# Patient Record
Sex: FEMALE | Race: WHITE | NOT HISPANIC OR LATINO
[De-identification: names, ages, dates, MRNs, and addresses within clinical notes are randomized per-mention and may not be internally consistent; named-entity substitution may affect disease eponyms.]

---

## 2017-01-13 ENCOUNTER — RX RENEWAL (OUTPATIENT)
Age: 52
End: 2017-01-13

## 2017-01-23 ENCOUNTER — APPOINTMENT (OUTPATIENT)
Dept: NEUROLOGY | Facility: CLINIC | Age: 52
End: 2017-01-23

## 2017-01-23 VITALS — DIASTOLIC BLOOD PRESSURE: 89 MMHG | HEART RATE: 82 BPM | SYSTOLIC BLOOD PRESSURE: 117 MMHG

## 2017-01-23 VITALS — WEIGHT: 112 LBS | HEIGHT: 67 IN | BODY MASS INDEX: 17.58 KG/M2

## 2017-05-01 ENCOUNTER — OTHER (OUTPATIENT)
Age: 52
End: 2017-05-01

## 2017-07-24 ENCOUNTER — APPOINTMENT (OUTPATIENT)
Dept: NEUROLOGY | Facility: CLINIC | Age: 52
End: 2017-07-24

## 2017-07-24 VITALS
HEIGHT: 67 IN | BODY MASS INDEX: 17.58 KG/M2 | HEART RATE: 57 BPM | SYSTOLIC BLOOD PRESSURE: 109 MMHG | WEIGHT: 112 LBS | DIASTOLIC BLOOD PRESSURE: 69 MMHG

## 2017-08-05 ENCOUNTER — MEDICATION RENEWAL (OUTPATIENT)
Age: 52
End: 2017-08-05

## 2017-10-30 ENCOUNTER — APPOINTMENT (OUTPATIENT)
Dept: NEUROLOGY | Facility: CLINIC | Age: 52
End: 2017-10-30
Payer: COMMERCIAL

## 2017-10-30 VITALS
WEIGHT: 112 LBS | SYSTOLIC BLOOD PRESSURE: 118 MMHG | DIASTOLIC BLOOD PRESSURE: 70 MMHG | BODY MASS INDEX: 17.58 KG/M2 | HEIGHT: 67 IN

## 2017-10-30 PROCEDURE — 99214 OFFICE O/P EST MOD 30 MIN: CPT

## 2017-12-11 ENCOUNTER — OUTPATIENT (OUTPATIENT)
Dept: OUTPATIENT SERVICES | Facility: HOSPITAL | Age: 52
LOS: 1 days | End: 2017-12-11
Payer: COMMERCIAL

## 2017-12-11 ENCOUNTER — APPOINTMENT (OUTPATIENT)
Dept: MAMMOGRAPHY | Facility: CLINIC | Age: 52
End: 2017-12-11
Payer: COMMERCIAL

## 2017-12-11 ENCOUNTER — APPOINTMENT (OUTPATIENT)
Dept: ULTRASOUND IMAGING | Facility: CLINIC | Age: 52
End: 2017-12-11
Payer: COMMERCIAL

## 2017-12-11 DIAGNOSIS — Z00.8 ENCOUNTER FOR OTHER GENERAL EXAMINATION: ICD-10-CM

## 2017-12-11 PROCEDURE — G0202: CPT | Mod: 26

## 2017-12-11 PROCEDURE — 77063 BREAST TOMOSYNTHESIS BI: CPT | Mod: 26

## 2017-12-11 PROCEDURE — 77063 BREAST TOMOSYNTHESIS BI: CPT

## 2017-12-11 PROCEDURE — 77067 SCR MAMMO BI INCL CAD: CPT

## 2018-02-05 ENCOUNTER — RX RENEWAL (OUTPATIENT)
Age: 53
End: 2018-02-05

## 2018-02-05 ENCOUNTER — MEDICATION RENEWAL (OUTPATIENT)
Age: 53
End: 2018-02-05

## 2018-02-06 ENCOUNTER — INPATIENT (INPATIENT)
Facility: HOSPITAL | Age: 53
LOS: 2 days | Discharge: HOME | End: 2018-02-09
Attending: FAMILY MEDICINE | Admitting: FAMILY MEDICINE
Payer: COMMERCIAL

## 2018-02-06 VITALS — HEIGHT: 66 IN | WEIGHT: 110.01 LBS

## 2018-02-06 LAB
ALBUMIN SERPL ELPH-MCNC: 4.1 G/DL — SIGNIFICANT CHANGE UP (ref 3.3–5)
ALP SERPL-CCNC: 72 U/L — SIGNIFICANT CHANGE UP (ref 40–120)
ALT FLD-CCNC: 15 U/L — SIGNIFICANT CHANGE UP (ref 12–78)
ANION GAP SERPL CALC-SCNC: 8 MMOL/L — SIGNIFICANT CHANGE UP (ref 5–17)
APPEARANCE UR: CLEAR — SIGNIFICANT CHANGE UP
AST SERPL-CCNC: 22 U/L — SIGNIFICANT CHANGE UP (ref 15–37)
BACTERIA # UR AUTO: (no result)
BASOPHILS # BLD AUTO: 0 K/UL — SIGNIFICANT CHANGE UP (ref 0–0.2)
BILIRUB SERPL-MCNC: 0.9 MG/DL — SIGNIFICANT CHANGE UP (ref 0.2–1.2)
BILIRUB UR-MCNC: NEGATIVE — SIGNIFICANT CHANGE UP
BUN SERPL-MCNC: 17 MG/DL — SIGNIFICANT CHANGE UP (ref 7–23)
CALCIUM SERPL-MCNC: 9 MG/DL — SIGNIFICANT CHANGE UP (ref 8.5–10.1)
CHLORIDE SERPL-SCNC: 109 MMOL/L — HIGH (ref 96–108)
CO2 SERPL-SCNC: 22 MMOL/L — SIGNIFICANT CHANGE UP (ref 22–31)
COLOR SPEC: YELLOW — SIGNIFICANT CHANGE UP
CREAT SERPL-MCNC: 1.01 MG/DL — SIGNIFICANT CHANGE UP (ref 0.5–1.3)
DIFF PNL FLD: (no result)
EOSINOPHIL # BLD AUTO: 0 K/UL — SIGNIFICANT CHANGE UP (ref 0–0.5)
EPI CELLS # UR: (no result)
GLUCOSE SERPL-MCNC: 95 MG/DL — SIGNIFICANT CHANGE UP (ref 70–99)
GLUCOSE UR QL: NEGATIVE MG/DL — SIGNIFICANT CHANGE UP
HCT VFR BLD CALC: 46.6 % — HIGH (ref 34.5–45)
HGB BLD-MCNC: 15.1 G/DL — SIGNIFICANT CHANGE UP (ref 11.5–15.5)
KETONES UR-MCNC: (no result)
LACTATE SERPL-SCNC: 1.5 MMOL/L — SIGNIFICANT CHANGE UP (ref 0.7–2)
LEUKOCYTE ESTERASE UR-ACNC: NEGATIVE — SIGNIFICANT CHANGE UP
LIDOCAIN IGE QN: 150 U/L — SIGNIFICANT CHANGE UP (ref 73–393)
LYMPHOCYTES # BLD AUTO: 0.2 K/UL — LOW (ref 1–3.3)
LYMPHOCYTES # BLD AUTO: 5 % — LOW (ref 13–44)
MANUAL DIF COMMENT BLD-IMP: SIGNIFICANT CHANGE UP
MCHC RBC-ENTMCNC: 29.7 PG — SIGNIFICANT CHANGE UP (ref 27–34)
MCHC RBC-ENTMCNC: 32.3 GM/DL — SIGNIFICANT CHANGE UP (ref 32–36)
MCV RBC AUTO: 91.9 FL — SIGNIFICANT CHANGE UP (ref 80–100)
METAMYELOCYTES # FLD: 1 % — HIGH (ref 0–0)
MONOCYTES # BLD AUTO: 0.2 K/UL — SIGNIFICANT CHANGE UP (ref 0–0.9)
MONOCYTES NFR BLD AUTO: 2 % — SIGNIFICANT CHANGE UP (ref 2–14)
NEUTROPHILS # BLD AUTO: 7.3 K/UL — SIGNIFICANT CHANGE UP (ref 1.8–7.4)
NEUTROPHILS NFR BLD AUTO: 68 % — SIGNIFICANT CHANGE UP (ref 43–77)
NEUTS BAND # BLD: 24 % — HIGH (ref 0–8)
NITRITE UR-MCNC: NEGATIVE — SIGNIFICANT CHANGE UP
PH UR: 5 — SIGNIFICANT CHANGE UP (ref 5–8)
PLAT MORPH BLD: NORMAL — SIGNIFICANT CHANGE UP
PLATELET # BLD AUTO: 223 K/UL — SIGNIFICANT CHANGE UP (ref 150–400)
POTASSIUM SERPL-MCNC: 3.9 MMOL/L — SIGNIFICANT CHANGE UP (ref 3.5–5.3)
POTASSIUM SERPL-SCNC: 3.9 MMOL/L — SIGNIFICANT CHANGE UP (ref 3.5–5.3)
PROT SERPL-MCNC: 7.9 GM/DL — SIGNIFICANT CHANGE UP (ref 6–8.3)
PROT UR-MCNC: 15 MG/DL
RBC # BLD: 5.07 M/UL — SIGNIFICANT CHANGE UP (ref 3.8–5.2)
RBC # FLD: 12.2 % — SIGNIFICANT CHANGE UP (ref 10.3–14.5)
RBC BLD AUTO: NORMAL — SIGNIFICANT CHANGE UP
RBC CASTS # UR COMP ASSIST: (no result) /HPF (ref 0–4)
SODIUM SERPL-SCNC: 139 MMOL/L — SIGNIFICANT CHANGE UP (ref 135–145)
SP GR SPEC: 1 — LOW (ref 1.01–1.02)
UROBILINOGEN FLD QL: NEGATIVE MG/DL — SIGNIFICANT CHANGE UP
WBC # BLD: 7.7 K/UL — SIGNIFICANT CHANGE UP (ref 3.8–10.5)
WBC # FLD AUTO: 7.7 K/UL — SIGNIFICANT CHANGE UP (ref 3.8–10.5)
WBC UR QL: SIGNIFICANT CHANGE UP

## 2018-02-06 PROCEDURE — 93010 ELECTROCARDIOGRAM REPORT: CPT

## 2018-02-06 PROCEDURE — 71045 X-RAY EXAM CHEST 1 VIEW: CPT | Mod: 26

## 2018-02-06 PROCEDURE — 99285 EMERGENCY DEPT VISIT HI MDM: CPT

## 2018-02-06 PROCEDURE — 74177 CT ABD & PELVIS W/CONTRAST: CPT | Mod: 26

## 2018-02-06 RX ORDER — SODIUM CHLORIDE 9 MG/ML
1000 INJECTION INTRAMUSCULAR; INTRAVENOUS; SUBCUTANEOUS
Qty: 0 | Refills: 0 | Status: DISCONTINUED | OUTPATIENT
Start: 2018-02-06 | End: 2018-02-09

## 2018-02-06 RX ORDER — ONDANSETRON 8 MG/1
4 TABLET, FILM COATED ORAL EVERY 6 HOURS
Qty: 0 | Refills: 0 | Status: DISCONTINUED | OUTPATIENT
Start: 2018-02-06 | End: 2018-02-09

## 2018-02-06 RX ORDER — DOCUSATE SODIUM 100 MG
100 CAPSULE ORAL
Qty: 0 | Refills: 0 | Status: DISCONTINUED | OUTPATIENT
Start: 2018-02-06 | End: 2018-02-09

## 2018-02-06 RX ORDER — LACOSAMIDE 50 MG/1
150 TABLET ORAL
Qty: 0 | Refills: 0 | Status: DISCONTINUED | OUTPATIENT
Start: 2018-02-06 | End: 2018-02-09

## 2018-02-06 RX ORDER — PIPERACILLIN AND TAZOBACTAM 4; .5 G/20ML; G/20ML
3.38 INJECTION, POWDER, LYOPHILIZED, FOR SOLUTION INTRAVENOUS ONCE
Qty: 0 | Refills: 0 | Status: COMPLETED | OUTPATIENT
Start: 2018-02-06 | End: 2018-02-06

## 2018-02-06 RX ORDER — ACETAMINOPHEN 500 MG
650 TABLET ORAL EVERY 6 HOURS
Qty: 0 | Refills: 0 | Status: DISCONTINUED | OUTPATIENT
Start: 2018-02-06 | End: 2018-02-09

## 2018-02-06 RX ORDER — DIPHENHYDRAMINE HCL 50 MG
25 CAPSULE ORAL ONCE
Qty: 0 | Refills: 0 | Status: COMPLETED | OUTPATIENT
Start: 2018-02-06 | End: 2018-02-06

## 2018-02-06 RX ORDER — MORPHINE SULFATE 50 MG/1
2 CAPSULE, EXTENDED RELEASE ORAL EVERY 6 HOURS
Qty: 0 | Refills: 0 | Status: DISCONTINUED | OUTPATIENT
Start: 2018-02-06 | End: 2018-02-09

## 2018-02-06 RX ORDER — LEVOTHYROXINE SODIUM 125 MCG
112 TABLET ORAL DAILY
Qty: 0 | Refills: 0 | Status: DISCONTINUED | OUTPATIENT
Start: 2018-02-06 | End: 2018-02-09

## 2018-02-06 RX ORDER — ONDANSETRON 8 MG/1
4 TABLET, FILM COATED ORAL ONCE
Qty: 0 | Refills: 0 | Status: COMPLETED | OUTPATIENT
Start: 2018-02-06 | End: 2018-02-06

## 2018-02-06 RX ORDER — MORPHINE SULFATE 50 MG/1
4 CAPSULE, EXTENDED RELEASE ORAL ONCE
Qty: 0 | Refills: 0 | Status: DISCONTINUED | OUTPATIENT
Start: 2018-02-06 | End: 2018-02-06

## 2018-02-06 RX ORDER — SODIUM CHLORIDE 9 MG/ML
1000 INJECTION INTRAMUSCULAR; INTRAVENOUS; SUBCUTANEOUS ONCE
Qty: 0 | Refills: 0 | Status: COMPLETED | OUTPATIENT
Start: 2018-02-06 | End: 2018-02-06

## 2018-02-06 RX ORDER — PIPERACILLIN AND TAZOBACTAM 4; .5 G/20ML; G/20ML
3.38 INJECTION, POWDER, LYOPHILIZED, FOR SOLUTION INTRAVENOUS EVERY 8 HOURS
Qty: 0 | Refills: 0 | Status: DISCONTINUED | OUTPATIENT
Start: 2018-02-06 | End: 2018-02-09

## 2018-02-06 RX ORDER — PANTOPRAZOLE SODIUM 20 MG/1
40 TABLET, DELAYED RELEASE ORAL DAILY
Qty: 0 | Refills: 0 | Status: DISCONTINUED | OUTPATIENT
Start: 2018-02-06 | End: 2018-02-07

## 2018-02-06 RX ORDER — VANCOMYCIN HCL 1 G
1000 VIAL (EA) INTRAVENOUS ONCE
Qty: 0 | Refills: 0 | Status: COMPLETED | OUTPATIENT
Start: 2018-02-06 | End: 2018-02-06

## 2018-02-06 RX ADMIN — PIPERACILLIN AND TAZOBACTAM 200 GRAM(S): 4; .5 INJECTION, POWDER, LYOPHILIZED, FOR SOLUTION INTRAVENOUS at 19:41

## 2018-02-06 RX ADMIN — SODIUM CHLORIDE 1000 MILLILITER(S): 9 INJECTION INTRAMUSCULAR; INTRAVENOUS; SUBCUTANEOUS at 14:11

## 2018-02-06 RX ADMIN — MORPHINE SULFATE 2 MILLIGRAM(S): 50 CAPSULE, EXTENDED RELEASE ORAL at 23:15

## 2018-02-06 RX ADMIN — ONDANSETRON 4 MILLIGRAM(S): 8 TABLET, FILM COATED ORAL at 17:52

## 2018-02-06 RX ADMIN — MORPHINE SULFATE 4 MILLIGRAM(S): 50 CAPSULE, EXTENDED RELEASE ORAL at 14:11

## 2018-02-06 RX ADMIN — MORPHINE SULFATE 4 MILLIGRAM(S): 50 CAPSULE, EXTENDED RELEASE ORAL at 17:52

## 2018-02-06 RX ADMIN — ONDANSETRON 4 MILLIGRAM(S): 8 TABLET, FILM COATED ORAL at 14:11

## 2018-02-06 RX ADMIN — SODIUM CHLORIDE 100 MILLILITER(S): 9 INJECTION INTRAMUSCULAR; INTRAVENOUS; SUBCUTANEOUS at 20:46

## 2018-02-06 RX ADMIN — Medication 25 MILLIGRAM(S): at 19:41

## 2018-02-06 RX ADMIN — Medication 250 MILLIGRAM(S): at 17:52

## 2018-02-06 RX ADMIN — LACOSAMIDE 150 MILLIGRAM(S): 50 TABLET ORAL at 21:03

## 2018-02-06 NOTE — H&P ADULT - HISTORY OF PRESENT ILLNESS
53 y.o. female with PMH fibromyalgia, hypothyroidism, trigeminal neuralgia, seizure disorder, hx CSF leak, L renal calculus, reflex sympathetic dystrophy presents with abdominal pain since this AM. Pt reports waking up at 4AM with abdominal pain that is supraumbilical and occasional radiation to lower quadrants. States associated nausea and vomiting. Pt reports multiple vomiting episodes and after it, pt's pain improves for 15-20 minutes. States severe sharp pain was worse than when she had her gallbladder removed. Pt denies any change in medications or food intake. Denies fever. +diaphoresis. No diarrhea or dysuria.     PMH: as above  PSH: c section, cholecystectomy, hysterectomy, s/p craniotomy 7/2013  Social Hx: smokes 3 cigarettes daily, denies EtOH or drugs  Family Hx: pt denies  ROS: per HPI

## 2018-02-06 NOTE — H&P ADULT - ASSESSMENT
53 y.o. female with PMH fibromyalgia, hypothyroidism, trigeminal neuralgia, seizure disorder, hx CSF leak, L renal calculus, reflex sympathetic dystrophy presents with abdominal pain since this AM. Pt reports waking up at 4AM with abdominal pain that is supraumbilical and occasional radiation to lower quadrants. States associated nausea and vomiting. Pt reports multiple vomiting episodes and after it, pt's pain improves for 15-20 minutes. States severe sharp pain was worse than when she had her gallbladder removed. Pt denies any change in medications or food intake. Denies fever. +diaphoresis. No diarrhea or dysuria.       #abdominal pain, nausea/vomiting  -?gastritis  -admit to med surg  -CT abdomen/pelvis noted  -pain control prn  -iv hydration  -add PPI  -liquid diet  -antiemetics  -GI consult, Dr Colbert    #bandemia  -unclear source  -CT abd noted, CXR neg, UA neg  -f/u cultures  -IV hydration  -ID consult    #hypothyroidism  -cont synthroid 112mcg    #trigeminal neuralgia, seizure disorder  -cont vimpat  -seizure precautions    #tobacco use  -encourage tobacco cessation    #DVT ppx  -SCDs

## 2018-02-06 NOTE — H&P ADULT - NSHPPHYSICALEXAM_GEN_ALL_CORE
Vital Signs Last 24 Hrs  T(C): 36.3 (06 Feb 2018 13:48), Max: 36.3 (06 Feb 2018 13:48)  T(F): 97.3 (06 Feb 2018 13:48), Max: 97.3 (06 Feb 2018 13:48)  HR: 75 (06 Feb 2018 13:48) (75 - 75)  BP: 102/60 (06 Feb 2018 13:48) (102/60 - 102/60)  BP(mean): --  RR: 19 (06 Feb 2018 13:48) (19 - 19)  SpO2: 100% (06 Feb 2018 13:48) (100% - 100%)    GEN: appears comfortable  Neuro: AAOx3, nonfocal  HEENT: NC/AT, EOMI  Neck: no thyroidmegaly, no JVD  Cardiovascular: S1S2 present, regular rhythm, no murmur  Respiratory: breath sounds normal bilaterally, no wheezing, no rales, no rhonchi  Gastrointestinal: bowel sounds normal, soft, supraumbilical abdominal tenderness, no guarding or rebound  Musculoskeletal: no muscle tenderness  Extremities: No edema  Skin: No rash

## 2018-02-06 NOTE — ED PROVIDER NOTE - PSH
H/O total hysterectomy    History of     History of cholecystectomy    S/P craniotomy  microvascular decompression in 2013

## 2018-02-06 NOTE — ED ADULT NURSE NOTE - CHIEF COMPLAINT QUOTE
Abdominal pain that started this morning. + vomiting. Denies fevers. States she has had this pain before when her gallbladder ruptured.

## 2018-02-06 NOTE — ED PROVIDER NOTE - PMH
CSF leak    Fibromyalgia    H/O: hypothyroidism    Reflex sympathetic dystrophy of the arm  left arm  Renal calculus  left kidney  Trigeminal neuralgia

## 2018-02-06 NOTE — ED PROVIDER NOTE - OBJECTIVE STATEMENT
52 y/o female with PSHx of cholecystectomy, hysterectomy presents to the ED c/o abd pain, vomiting, nausea. Pt reports similar pain in the past, identical to when she had her gallbladder removed. Vomiting started 4:30 AM and continued until 1pm, vomiting stopped but pt continues to feel nauseous. Denies CP or SOB. Allergic to demerol - reacts with hives. No urinary complaints. Last bowel movement yesterday, normal. No fever. No vaginal bleeding. Pt took anti-nausea medication at home with mild improvement but continued to vomit.

## 2018-02-07 LAB
ANION GAP SERPL CALC-SCNC: 7 MMOL/L — SIGNIFICANT CHANGE UP (ref 5–17)
ANISOCYTOSIS BLD QL: SLIGHT — SIGNIFICANT CHANGE UP
BASOPHILS # BLD AUTO: 0 K/UL — SIGNIFICANT CHANGE UP (ref 0–0.2)
BASOPHILS NFR BLD AUTO: 1 % — SIGNIFICANT CHANGE UP (ref 0–2)
BUN SERPL-MCNC: 10 MG/DL — SIGNIFICANT CHANGE UP (ref 7–23)
CALCIUM SERPL-MCNC: 7.5 MG/DL — LOW (ref 8.5–10.1)
CHLORIDE SERPL-SCNC: 111 MMOL/L — HIGH (ref 96–108)
CO2 SERPL-SCNC: 23 MMOL/L — SIGNIFICANT CHANGE UP (ref 22–31)
CREAT SERPL-MCNC: 0.78 MG/DL — SIGNIFICANT CHANGE UP (ref 0.5–1.3)
EOSINOPHIL # BLD AUTO: 0 K/UL — SIGNIFICANT CHANGE UP (ref 0–0.5)
GLUCOSE SERPL-MCNC: 80 MG/DL — SIGNIFICANT CHANGE UP (ref 70–99)
HCT VFR BLD CALC: 35.1 % — SIGNIFICANT CHANGE UP (ref 34.5–45)
HCT VFR BLD CALC: 35.8 % — SIGNIFICANT CHANGE UP (ref 34.5–45)
HGB BLD-MCNC: 11.4 G/DL — LOW (ref 11.5–15.5)
HGB BLD-MCNC: 11.4 G/DL — LOW (ref 11.5–15.5)
LG PLATELETS BLD QL AUTO: SLIGHT — SIGNIFICANT CHANGE UP
LYMPHOCYTES # BLD AUTO: 0.4 K/UL — LOW (ref 1–3.3)
LYMPHOCYTES # BLD AUTO: 12 % — LOW (ref 13–44)
MANUAL DIF COMMENT BLD-IMP: SIGNIFICANT CHANGE UP
MCHC RBC-ENTMCNC: 29.7 PG — SIGNIFICANT CHANGE UP (ref 27–34)
MCHC RBC-ENTMCNC: 29.9 PG — SIGNIFICANT CHANGE UP (ref 27–34)
MCHC RBC-ENTMCNC: 32 GM/DL — SIGNIFICANT CHANGE UP (ref 32–36)
MCHC RBC-ENTMCNC: 32.5 GM/DL — SIGNIFICANT CHANGE UP (ref 32–36)
MCV RBC AUTO: 92 FL — SIGNIFICANT CHANGE UP (ref 80–100)
MCV RBC AUTO: 93 FL — SIGNIFICANT CHANGE UP (ref 80–100)
MONOCYTES # BLD AUTO: 0.2 K/UL — SIGNIFICANT CHANGE UP (ref 0–0.9)
MONOCYTES NFR BLD AUTO: 5 % — SIGNIFICANT CHANGE UP (ref 2–14)
NEUTROPHILS # BLD AUTO: 2.4 K/UL — SIGNIFICANT CHANGE UP (ref 1.8–7.4)
NEUTROPHILS NFR BLD AUTO: 81 % — HIGH (ref 43–77)
NEUTS BAND # BLD: 1 % — SIGNIFICANT CHANGE UP (ref 0–8)
OVALOCYTES BLD QL SMEAR: SLIGHT — SIGNIFICANT CHANGE UP
PLAT MORPH BLD: NORMAL — SIGNIFICANT CHANGE UP
PLATELET # BLD AUTO: 142 K/UL — LOW (ref 150–400)
PLATELET # BLD AUTO: 146 K/UL — LOW (ref 150–400)
POIKILOCYTOSIS BLD QL AUTO: SLIGHT — SIGNIFICANT CHANGE UP
POTASSIUM SERPL-MCNC: 3.3 MMOL/L — LOW (ref 3.5–5.3)
POTASSIUM SERPL-SCNC: 3.3 MMOL/L — LOW (ref 3.5–5.3)
RBC # BLD: 3.81 M/UL — SIGNIFICANT CHANGE UP (ref 3.8–5.2)
RBC # BLD: 3.85 M/UL — SIGNIFICANT CHANGE UP (ref 3.8–5.2)
RBC # FLD: 12.3 % — SIGNIFICANT CHANGE UP (ref 10.3–14.5)
RBC # FLD: 12.6 % — SIGNIFICANT CHANGE UP (ref 10.3–14.5)
RBC BLD AUTO: (no result)
SODIUM SERPL-SCNC: 141 MMOL/L — SIGNIFICANT CHANGE UP (ref 135–145)
WBC # BLD: 3 K/UL — LOW (ref 3.8–10.5)
WBC # BLD: 3.4 K/UL — LOW (ref 3.8–10.5)
WBC # FLD AUTO: 3 K/UL — LOW (ref 3.8–10.5)
WBC # FLD AUTO: 3.4 K/UL — LOW (ref 3.8–10.5)

## 2018-02-07 PROCEDURE — 74183 MRI ABD W/O CNTR FLWD CNTR: CPT | Mod: 26

## 2018-02-07 PROCEDURE — 72148 MRI LUMBAR SPINE W/O DYE: CPT | Mod: 26

## 2018-02-07 RX ORDER — ENOXAPARIN SODIUM 100 MG/ML
40 INJECTION SUBCUTANEOUS DAILY
Qty: 0 | Refills: 0 | Status: DISCONTINUED | OUTPATIENT
Start: 2018-02-07 | End: 2018-02-09

## 2018-02-07 RX ORDER — SODIUM CHLORIDE 9 MG/ML
500 INJECTION INTRAMUSCULAR; INTRAVENOUS; SUBCUTANEOUS ONCE
Qty: 0 | Refills: 0 | Status: COMPLETED | OUTPATIENT
Start: 2018-02-07 | End: 2018-02-07

## 2018-02-07 RX ORDER — INFLUENZA VIRUS VACCINE 15; 15; 15; 15 UG/.5ML; UG/.5ML; UG/.5ML; UG/.5ML
0.5 SUSPENSION INTRAMUSCULAR ONCE
Qty: 0 | Refills: 0 | Status: COMPLETED | OUTPATIENT
Start: 2018-02-07 | End: 2018-02-07

## 2018-02-07 RX ORDER — POTASSIUM CHLORIDE 20 MEQ
40 PACKET (EA) ORAL ONCE
Qty: 0 | Refills: 0 | Status: COMPLETED | OUTPATIENT
Start: 2018-02-07 | End: 2018-02-07

## 2018-02-07 RX ORDER — PANTOPRAZOLE SODIUM 20 MG/1
40 TABLET, DELAYED RELEASE ORAL
Qty: 0 | Refills: 0 | Status: DISCONTINUED | OUTPATIENT
Start: 2018-02-07 | End: 2018-02-09

## 2018-02-07 RX ORDER — LACOSAMIDE 50 MG/1
1 TABLET ORAL
Qty: 0 | Refills: 0 | COMMUNITY

## 2018-02-07 RX ADMIN — PIPERACILLIN AND TAZOBACTAM 25 GRAM(S): 4; .5 INJECTION, POWDER, LYOPHILIZED, FOR SOLUTION INTRAVENOUS at 02:55

## 2018-02-07 RX ADMIN — PANTOPRAZOLE SODIUM 40 MILLIGRAM(S): 20 TABLET, DELAYED RELEASE ORAL at 11:55

## 2018-02-07 RX ADMIN — Medication 112 MICROGRAM(S): at 05:45

## 2018-02-07 RX ADMIN — PANTOPRAZOLE SODIUM 40 MILLIGRAM(S): 20 TABLET, DELAYED RELEASE ORAL at 17:20

## 2018-02-07 RX ADMIN — Medication 40 MILLIEQUIVALENT(S): at 11:54

## 2018-02-07 RX ADMIN — MORPHINE SULFATE 2 MILLIGRAM(S): 50 CAPSULE, EXTENDED RELEASE ORAL at 18:26

## 2018-02-07 RX ADMIN — PIPERACILLIN AND TAZOBACTAM 25 GRAM(S): 4; .5 INJECTION, POWDER, LYOPHILIZED, FOR SOLUTION INTRAVENOUS at 17:20

## 2018-02-07 RX ADMIN — LACOSAMIDE 150 MILLIGRAM(S): 50 TABLET ORAL at 21:55

## 2018-02-07 RX ADMIN — SODIUM CHLORIDE 500 MILLILITER(S): 9 INJECTION INTRAMUSCULAR; INTRAVENOUS; SUBCUTANEOUS at 05:41

## 2018-02-07 RX ADMIN — SODIUM CHLORIDE 100 MILLILITER(S): 9 INJECTION INTRAMUSCULAR; INTRAVENOUS; SUBCUTANEOUS at 11:54

## 2018-02-07 RX ADMIN — ONDANSETRON 4 MILLIGRAM(S): 8 TABLET, FILM COATED ORAL at 18:26

## 2018-02-07 RX ADMIN — ONDANSETRON 4 MILLIGRAM(S): 8 TABLET, FILM COATED ORAL at 05:52

## 2018-02-07 RX ADMIN — PIPERACILLIN AND TAZOBACTAM 25 GRAM(S): 4; .5 INJECTION, POWDER, LYOPHILIZED, FOR SOLUTION INTRAVENOUS at 11:54

## 2018-02-07 RX ADMIN — ENOXAPARIN SODIUM 40 MILLIGRAM(S): 100 INJECTION SUBCUTANEOUS at 13:46

## 2018-02-07 RX ADMIN — LACOSAMIDE 150 MILLIGRAM(S): 50 TABLET ORAL at 07:50

## 2018-02-07 NOTE — CONSULT NOTE ADULT - ASSESSMENT
53 y.o. female with h/o fibromyalgia, hypothyroidism, trigeminal neuralgia, seizure disorder, hx CSF leak, L renal calculus, reflex sympathetic dystrophy is admitted for abdominal pain, nausea, and vomiting. Pt states the abdominal pain woke her up at 4AM on 2/6/2017. Pt also had nausea, vomiting with the abdominal pain and the vomiting was going on for about 8 hours. Pt reports perfusely sweating after vomiting. Pt states the pain  localized above the umbilical and occasional radiation to lower quadrants. Pt states it was a severe sharp pain which was worse than when she had her gallbladder removed. Pt denies any change in medications or food intake. Pt denies fever, chills, SOB, chest pain, diarrhea.   In the ED, pt was found to have bandemia and was given Vancomycin and Zosyn.   This morning, pt reports back pain, abdominal pain improving, no vomiting, no fever, no chills, no chest pain.     1. Leukopenia. Left shift. Abdominal pain. Back pain.    -Bandemia resolved   -obtain BC x2  -Continue Zosyn 3.375mg IV [id number]   -reason for abx use and side effects reviewed with patient; monitor BMP   -monitor BMP, vitals    -f/u CBC, BC  -f/u abdomen MR  -supportive care    2. Other issues:   -care per medicine 53 y.o. female with h/o fibromyalgia, hypothyroidism, trigeminal neuralgia, seizure disorder, hx CSF leak, L renal calculus, reflex sympathetic dystrophy is admitted for abdominal pain, nausea, and vomiting. Pt states the abdominal pain woke her up at 4AM on 2/6/2017. Pt also had nausea, vomiting with the abdominal pain and the vomiting was going on for about 8 hours. Pt reports perfusely sweating after vomiting. Pt states the pain  localized above the umbilical and occasional radiation to lower quadrants. Pt states it was a severe sharp pain which was worse than when she had her gallbladder removed. In the ED, pt was found to have bandemia and was given Vancomycin and Zosyn.       1. Leukopenia. Left shift. Abdominal pain. Back pain.    -the patient is clinically improved  -Bandemia resolved   -obtain BC x 2  -Continue Zosyn 3.375mg IV [id number]   -reason for abx use and side effects reviewed with patient; monitor BMP   -monitor BMP, vitals    -f/u CBC, BC  -f/u abdomen MR  -supportive care    2. Other issues:   -care per medicine 53 y.o. female with h/o fibromyalgia, hypothyroidism, trigeminal neuralgia, seizure disorder, hx CSF leak, L renal calculus, reflex sympathetic dystrophy is admitted for abdominal pain, nausea, and vomiting. Pt states the abdominal pain woke her up at 4AM on 2/6/2017. Pt also had nausea, vomiting with the abdominal pain and the vomiting was going on for about 8 hours. Pt reports perfusely sweating after vomiting. Pt states the pain  localized above the umbilical and occasional radiation to lower quadrants. Pt states it was a severe sharp pain which was worse than when she had her gallbladder removed. In the ED, pt was found to have bandemia and was given Vancomycin and Zosyn.       1. Chills. Leukopenia with left shift. Thrombocytopenia. Possible sepsis. ?cause ?acute cholangitis. Abdominal pain. Back pain. ?spine disease.   -chils and abdominal pain are improving  -Bandemia resolved   -obtain BC x 2  -continue Zosyn 3.375mg IV [id number] for now  -reason for abx use and side effects reviewed with patient; monitor BMP   -for MRCP  -will consider spine MRI if back pain is persistent  -monitor BMP, vitals    -f/u CBC, BC  -supportive care    2. Other issues:   -care per medicine

## 2018-02-07 NOTE — PROGRESS NOTE ADULT - ASSESSMENT
#abdominal pain, nausea/vomiting 2ndry to possible ?gastritis  -CT abdomen/pelvis noted  -Lipase and LActate WNL  -pain control prn  -iv hydration  -add PPI  -liquid diet  -antiemetics  -GI consult, Dr Colbert    #bandemia  -unclear source  -CT abd noted, CXR neg, UA neg  -Started on Zosyn prophylactically  -f/u cultures  -IV hydration  -ID consult    #hypothyroidism  -cont synthroid 112mcg    #trigeminal neuralgia, seizure disorder  -cont vimpat  -seizure precautions    #tobacco use  -encourage tobacco cessation    #DVT ppx  -SCDs

## 2018-02-07 NOTE — CONSULT NOTE ADULT - SUBJECTIVE AND OBJECTIVE BOX
Chief complaint: abdominal pain, nausea, vomiting    HPI: 53 y.o. female with h/o fibromyalgia, hypothyroidism, trigeminal neuralgia, seizure disorder, hx CSF leak, L renal calculus, reflex sympathetic dystrophy is admitted for abdominal pain, nausea, and vomiting. Pt states the abdominal pain woke her up at 4AM on 2017. Pt also had nausea, vomiting with the abdominal pain and the vomiting was going on for about 8 hours. Pt reports perfusely sweating after vomiting. Pt states the pain  localized above the umbilical and occasional radiation to lower quadrants. Pt states it was a severe sharp pain which was worse than when she had her gallbladder removed. Pt denies any change in medications or food intake. Pt denies fever, chills, SOB, chest pain, diarrhea.   In the ED, pt was found to have bandemia and was given Vancomycin and Zosyn.   This morning, pt reports back pain, abdominal pain improving, no vomiting, no fever, no chills, no chest pain.     REVIEW OF SYSTEMS: all other review of systems are negative except per HPI    PMH:  Fibromyalgia  CSF leak  Reflex sympathetic dystrophy of the arm: left arm  Renal calculus: left kidney    PSH: c section, cholecystectomy, hysterectomy, s/p craniotomy 2013    Social Hx: smokes 3 cigarettes daily, denies EtOH or drugs    Family Hx: no pertinent family hx       Allergies:   garlic (Anaphylaxis)  Levaquin (Pruritus; Rash)    Intolerances        MEDICATIONS  (STANDING):  influenza   Vaccine 0.5 milliLiter(s) IntraMuscular once  lacosamide 150 milliGRAM(s) Oral two times a day  levothyroxine 112 MICROGram(s) Oral daily  pantoprazole  Injectable 40 milliGRAM(s) IV Push daily  piperacillin/tazobactam IVPB. 3.375 Gram(s) IV Intermittent every 8 hours  potassium chloride    Tablet ER 40 milliEquivalent(s) Oral once  sodium chloride 0.9%. 1000 milliLiter(s) IV Continuous <Continuous>    MEDICATIONS  (PRN):  acetaminophen   Tablet 650 milliGRAM(s) Oral every 6 hours PRN For Temp greater than 38 C (100.4 F), mild pain  docusate sodium 100 milliGRAM(s) Oral two times a day PRN Constipation  morphine  - Injectable 2 milliGRAM(s) IV Push every 6 hours PRN Severe Pain (7 - 10)  ondansetron Injectable 4 milliGRAM(s) IV Push every 6 hours PRN Nausea      VITALS:  Vital Signs Last 24 Hrs  T(C): 36.8 (18 @ 05:35), Max: 36.9 (18 @ 20:45)  T(F): 98.2 (18 @ 05:35), Max: 98.4 (18 @ 20:45)  HR: 54 (18 @ 05:35) (54 - 88)  BP: 94/50 (18 @ 06:24) (76/43 - 113/67)  BP(mean): --  RR: 19 (18 @ 05:35) (16 - 22)  SpO2: 98% (18 @ 05:35) (98% - 100%)      PHYSICAL EXAM:    Constitutional: comfortable   HEENT: NC/AT, EOMI, PERRLA  Neck: supple  Back: tenderness to palpation in the region of the left scapula   Respiratory: clear  Cardiovascular: S1S2 regular, no murmurs  Abdomen: soft, supraumbilical, and right quadrants abdominal tenderness (more tenderness in supraumbilical), not distended, positive BS  Genitourinary: deferred  Rectal: deferred  Musculoskeletal: no muscle tenderness, no joint swelling or tenderness  Extremities: no pedal edema  Neurological: AxOx3, moving all extremities, no focal deficits  Skin: no rashes        LABS:                          11.4   3.0   )-----------( 146      ( 2018 06:45 )             35.8       02-    141  |  111<H>  |  10  ----------------------------<  80  3.3<L>   |  23  |  0.78    Ca    7.5<L>      2018 06:45    TPro  7.9  /  Alb  4.1  /  TBili  0.9  /  DBili  x   /  AST  22  /  ALT  15  /  AlkPhos  72  02-06              Urinalysis Basic - ( 2018 16:34 )    Color: Yellow / Appearance: Clear / S.005 / pH: x  Gluc: x / Ketone: Small  / Bili: Negative / Urobili: Negative mg/dL   Blood: x / Protein: 15 mg/dL / Nitrite: Negative   Leuk Esterase: Negative / RBC: 3-5 /HPF / WBC 0-2   Sq Epi: x / Non Sq Epi: Many / Bacteria: Occasional        RADIOLOGY:    < from: Xray Chest 1 View AP/PA. (18 @ 16:29) >    EXAM:  XR CHEST AP OR PA 1V                            PROCEDURE DATE:  2018          INTERPRETATION:  History: Abdominal pain    AP radiograph of the chest is performed and compared to 2016.    The cardiomediastinal silhouette is normal. the trachea is midline. There   is no focal infiltrate or pleural effusion. The osseous structures are   unremarkable.    Impression: No active pulmonary disease. No change.        < from: CT Abdomen and Pelvis w/ IV Cont (18 @ 15:08) >    EXAM:  CT ABDOMEN AND PELVIS IC                            PROCEDURE DATE:  2018          INTERPRETATION:  CLINICAL STATEMENT: abdominal pain    TECHNIQUE: CT of the abdomen and pelvis was performed with IV contrast.   Oral contrast  was administered. Approximately 90 cc of Omnipaque 350   administered.    COMPARISON: 2016    FINDINGS:    The lower chest is unremarkable.    The liver is unremarkable. The gallbladder is absent.    The spleen, pancreas and adrenal glands are unremarkable.The kidneys   demonstrate stable small low-attenuation foci in the lower and upper   poles on the left. Nonobstructive stones are  seen in the left kidney.   The fluid-filled bladder appears intact.    There is no bowel obstruction. A normal appendix is seen.    There is no intraperitoneal free air.  There is no free fluid. The aorta   is not aneurysmal.    There is no significant abdominal, retroperitoneal or pelvic   lymphadenopathy. The pelvic structures are unremarkable. The uterus is   notseen and is likely surgically absent.    The osseous structures are unremarkable.    IMPRESSION:    No acute pathology. Punctate nonobstructive left renal calculi are again   identified Chief complaint: abdominal pain, nausea, vomiting    HPI: 53 y.o. female with h/o fibromyalgia, hypothyroidism, trigeminal neuralgia, seizure disorder, hx CSF leak, L renal calculus, reflex sympathetic dystrophy was admitted on  for abdominal pain, nausea, and vomiting. Pt states the abdominal pain woke her up at 4AM on 2017. Pt also had nausea, vomiting with the abdominal pain and the vomiting was going on for about 8 hours. Pt reports perfusely sweating after vomiting. Pt states the pain  localized above the umbilical and occasional radiation to lower quadrants. Pt states it was a severe sharp pain which was worse than when she had her gallbladder removed. Pt denies any change in medications or food intake. Pt denies fever, chills, SOB, chest pain, diarrhea.   In the ED, pt was found to have bandemia and was given Vancomycin and Zosyn.   This morning, pt reports back pain, abdominal pain improving, no vomiting, no fever, no chills, no chest pain.     REVIEW OF SYSTEMS: all other review of systems are negative except per HPI    PMH:  Fibromyalgia  CSF leak  Reflex sympathetic dystrophy of the arm: left arm  Renal calculus: left kidney    PSH: C section, cholecystectomy, hysterectomy, s/p craniotomy 2013    Social Hx: smokes 3 cigarettes daily, denies EtOH or drugs    Family Hx: no pertinent family hx       Allergies:   garlic (Anaphylaxis)  Levaquin (Pruritus; Rash)    Intolerances      MEDICATIONS  (STANDING):  influenza   Vaccine 0.5 milliLiter(s) IntraMuscular once  lacosamide 150 milliGRAM(s) Oral two times a day  levothyroxine 112 MICROGram(s) Oral daily  pantoprazole  Injectable 40 milliGRAM(s) IV Push daily  piperacillin/tazobactam IVPB. 3.375 Gram(s) IV Intermittent every 8 hours  potassium chloride    Tablet ER 40 milliEquivalent(s) Oral once  sodium chloride 0.9%. 1000 milliLiter(s) IV Continuous <Continuous>    MEDICATIONS  (PRN):  acetaminophen   Tablet 650 milliGRAM(s) Oral every 6 hours PRN For Temp greater than 38 C (100.4 F), mild pain  docusate sodium 100 milliGRAM(s) Oral two times a day PRN Constipation  morphine  - Injectable 2 milliGRAM(s) IV Push every 6 hours PRN Severe Pain (7 - 10)  ondansetron Injectable 4 milliGRAM(s) IV Push every 6 hours PRN Nausea      VITALS:  Vital Signs Last 24 Hrs  T(C): 36.8 (18 @ 05:35), Max: 36.9 (18 @ 20:45)  T(F): 98.2 (18 @ 05:35), Max: 98.4 (18 @ 20:45)  HR: 54 (18 @ 05:35) (54 - 88)  BP: 94/50 (18 @ 06:24) (76/43 - 113/67)  BP(mean): --  RR: 19 (18 @ 05:35) (16 - 22)  SpO2: 98% (18 @ 05:35) (98% - 100%)      PHYSICAL EXAM:    Constitutional: comfortable   HEENT: NC/AT, EOMI, PERRLA  Neck: supple  Back: tenderness to palpation in the region of the left scapula   Respiratory: clear  Cardiovascular: S1S2 regular, no murmurs  Abdomen: soft, supraumbilical, and right quadrants abdominal tenderness (more tenderness in supraumbilical), not distended, positive BS  Genitourinary: deferred  Rectal: deferred  Musculoskeletal: no muscle tenderness, no joint swelling or tenderness  Extremities: no pedal edema  Neurological: AxOx3, moving all extremities, no focal deficits  Skin: no rashes        LABS:                          11.4   3.0   )-----------( 146      ( 2018 06:45 )             35.8       02-    141  |  111<H>  |  10  ----------------------------<  80  3.3<L>   |  23  |  0.78    Ca    7.5<L>      2018 06:45    TPro  7.9  /  Alb  4.1  /  TBili  0.9  /  DBili  x   /  AST  22  /  ALT  15  /  AlkPhos  72  02-06      Urinalysis Basic - ( 2018 16:34 )    Color: Yellow / Appearance: Clear / S.005 / pH: x  Gluc: x / Ketone: Small  / Bili: Negative / Urobili: Negative mg/dL   Blood: x / Protein: 15 mg/dL / Nitrite: Negative   Leuk Esterase: Negative / RBC: 3-5 /HPF / WBC 0-2   Sq Epi: x / Non Sq Epi: Many / Bacteria: Occasional        RADIOLOGY:    < from: Xray Chest 1 View AP/PA. (18 @ 16:29) >    EXAM:  XR CHEST AP OR PA 1V                            PROCEDURE DATE:  2018          INTERPRETATION:  History: Abdominal pain    AP radiograph of the chest is performed and compared to 2016.    The cardiomediastinal silhouette is normal. the trachea is midline. There   is no focal infiltrate or pleural effusion. The osseous structures are   unremarkable.    Impression: No active pulmonary disease. No change.        < from: CT Abdomen and Pelvis w/ IV Cont (18 @ 15:08) >    EXAM:  CT ABDOMEN AND PELVIS IC                            PROCEDURE DATE:  2018          INTERPRETATION:  CLINICAL STATEMENT: abdominal pain    TECHNIQUE: CT of the abdomen and pelvis was performed with IV contrast.   Oral contrast  was administered. Approximately 90 cc of Omnipaque 350   administered.    COMPARISON: 2016    FINDINGS:    The lower chest is unremarkable.    The liver is unremarkable. The gallbladder is absent.    The spleen, pancreas and adrenal glands are unremarkable.The kidneys   demonstrate stable small low-attenuation foci in the lower and upper   poles on the left. Nonobstructive stones are  seen in the left kidney.   The fluid-filled bladder appears intact.    There is no bowel obstruction. A normal appendix is seen.    There is no intraperitoneal free air.  There is no free fluid. The aorta   is not aneurysmal.    There is no significant abdominal, retroperitoneal or pelvic   lymphadenopathy. The pelvic structures are unremarkable. The uterus is   notseen and is likely surgically absent.    The osseous structures are unremarkable.    IMPRESSION:    No acute pathology. Punctate nonobstructive left renal calculi are again   identified Chief complaint: abdominal pain, nausea, vomiting    HPI: 53 y.o. female with h/o fibromyalgia, hypothyroidism, trigeminal neuralgia, seizure disorder, hx CSF leak, L renal calculus, reflex sympathetic dystrophy was admitted on  for abdominal pain, nausea, and vomiting. Pt states the abdominal pain woke her up at 4AM on 2017. Pt also had nausea, vomiting with the abdominal pain and the vomiting was going on for about 8 hours. Pt reports perfusely sweating after vomiting. Pt states the pain  localized above the umbilical and occasional radiation to lower quadrants. Pt states it was a severe sharp pain which was worse than when she had her gallbladder removed. Pt denies any change in medications or food intake. Pt denies fever, SOB, chest pain, diarrhea. She reports chills and feeling weak.  In the ED, pt was found to have bandemia and was given Vancomycin and Zosyn.   This morning, pt reports back pain, abdominal pain improving, no vomiting, no fever, had chills, no chest pain.     REVIEW OF SYSTEMS: all other review of systems are negative except per HPI    PMH:  Fibromyalgia  CSF leak  Reflex sympathetic dystrophy of the arm: left arm  Renal calculus: left kidney    PSH: C section, cholecystectomy, hysterectomy, s/p craniotomy 2013    Social Hx: smokes 3 cigarettes daily, denies EtOH or drugs    Family Hx: no pertinent family hx       Allergies:   garlic (Anaphylaxis)  Levaquin (Pruritus; Rash)    Intolerances      MEDICATIONS  (STANDING):  influenza   Vaccine 0.5 milliLiter(s) IntraMuscular once  lacosamide 150 milliGRAM(s) Oral two times a day  levothyroxine 112 MICROGram(s) Oral daily  pantoprazole  Injectable 40 milliGRAM(s) IV Push daily  piperacillin/tazobactam IVPB. 3.375 Gram(s) IV Intermittent every 8 hours  potassium chloride    Tablet ER 40 milliEquivalent(s) Oral once  sodium chloride 0.9%. 1000 milliLiter(s) IV Continuous <Continuous>    MEDICATIONS  (PRN):  acetaminophen   Tablet 650 milliGRAM(s) Oral every 6 hours PRN For Temp greater than 38 C (100.4 F), mild pain  docusate sodium 100 milliGRAM(s) Oral two times a day PRN Constipation  morphine  - Injectable 2 milliGRAM(s) IV Push every 6 hours PRN Severe Pain (7 - 10)  ondansetron Injectable 4 milliGRAM(s) IV Push every 6 hours PRN Nausea      VITALS:  Vital Signs Last 24 Hrs  T(C): 36.8 (18 @ 05:35), Max: 36.9 (18 @ 20:45)  T(F): 98.2 (18 @ 05:35), Max: 98.4 (18 @ 20:45)  HR: 54 (18 @ 05:35) (54 - 88)  BP: 94/50 (18 @ 06:24) (76/43 - 113/67)  BP(mean): --  RR: 19 (18 @ 05:35) (16 - 22)  SpO2: 98% (18 @ 05:35) (98% - 100%)      PHYSICAL EXAM:    Constitutional: comfortable   HEENT: NC/AT, EOMI, PERRLA  Neck: supple  Back: tenderness to palpation in the region of the left scapula   Respiratory: clear  Cardiovascular: S1S2 regular, no murmurs  Abdomen: soft, mild epigastric tenderness, not distended, positive BS  Genitourinary: deferred  Rectal: deferred  Musculoskeletal: no muscle tenderness, no joint swelling or tenderness; mid spine tenderness  Extremities: no pedal edema  Neurological: AxOx3, moving all extremities, no focal deficits  Skin: no rashes        LABS:                          11.4   3.0   )-----------( 146      ( 2018 06:45 )             35.8       02-    141  |  111<H>  |  10  ----------------------------<  80  3.3<L>   |  23  |  0.78    Ca    7.5<L>      2018 06:45    TPro  7.9  /  Alb  4.1  /  TBili  0.9  /  DBili  x   /  AST  22  /  ALT  15  /  AlkPhos  72  02-06      Urinalysis Basic - ( 2018 16:34 )    Color: Yellow / Appearance: Clear / S.005 / pH: x  Gluc: x / Ketone: Small  / Bili: Negative / Urobili: Negative mg/dL   Blood: x / Protein: 15 mg/dL / Nitrite: Negative   Leuk Esterase: Negative / RBC: 3-5 /HPF / WBC 0-2   Sq Epi: x / Non Sq Epi: Many / Bacteria: Occasional        RADIOLOGY:    < from: Xray Chest 1 View AP/PA. (18 @ 16:29) >    EXAM:  XR CHEST AP OR PA 1V                            PROCEDURE DATE:  2018          INTERPRETATION:  History: Abdominal pain    AP radiograph of the chest is performed and compared to 2016.    The cardiomediastinal silhouette is normal. the trachea is midline. There   is no focal infiltrate or pleural effusion. The osseous structures are   unremarkable.    Impression: No active pulmonary disease. No change.        < from: CT Abdomen and Pelvis w/ IV Cont (18 @ 15:08) >    EXAM:  CT ABDOMEN AND PELVIS IC                            PROCEDURE DATE:  2018          INTERPRETATION:  CLINICAL STATEMENT: abdominal pain    TECHNIQUE: CT of the abdomen and pelvis was performed with IV contrast.   Oral contrast  was administered. Approximately 90 cc of Omnipaque 350   administered.    COMPARISON: 2016    FINDINGS:    The lower chest is unremarkable.    The liver is unremarkable. The gallbladder is absent.    The spleen, pancreas and adrenal glands are unremarkable.The kidneys   demonstrate stable small low-attenuation foci in the lower and upper   poles on the left. Nonobstructive stones are  seen in the left kidney.   The fluid-filled bladder appears intact.    There is no bowel obstruction. A normal appendix is seen.    There is no intraperitoneal free air.  There is no free fluid. The aorta   is not aneurysmal.    There is no significant abdominal, retroperitoneal or pelvic   lymphadenopathy. The pelvic structures are unremarkable. The uterus is   notseen and is likely surgically absent.    The osseous structures are unremarkable.    IMPRESSION:    No acute pathology. Punctate nonobstructive left renal calculi are again   identified

## 2018-02-07 NOTE — CONSULT NOTE ADULT - ASSESSMENT
52 y/o female with c/o abdominal pain with radiation to her back.    abdominal pain of unknown origin? will r/o biliary etiology vs. gastritis?   ct scan: insignificant findings.  will obtain MRCP and repeat CBC  + bandemia with a drop in WBC?  continue zosyn IV prophylactic    continue PPI and diet as tolerated.    Will f/u on MRCP.     Seen and examined pt with Dr. Lanier.

## 2018-02-07 NOTE — PROGRESS NOTE ADULT - ASSESSMENT
53 y.o. female with PMH fibromyalgia, hypothyroidism, trigeminal neuralgia, seizure disorder, hx CSF leak, L renal calculus, reflex sympathetic dystrophy presents with abdominal pain since this AM. Pt reports waking up at 4AM with abdominal pain that is supraumbilical and occasional radiation to lower quadrants. States associated nausea and vomiting. Pt reports multiple vomiting episodes and after it, pt's pain improves for 15-20 minutes. States severe sharp pain was worse than when she had her gallbladder removed.      #abdominal pain, nausea/vomiting  -?gastritis  -CT abdomen/pelvis noted  -pain control prn  -iv hydration  -add PPI  -liquid diet  -antiemetics  -GI consult, Dr Colbert- with recommendations for MRCP- results unremarkable      #back pain- acute  - will check MRI lumbar spine  - pain control      #bandemia  -unclear source  -CT abd noted, CXR neg, UA neg  -f/u cultures  -IV hydration  -ID consult    #hypothyroidism  -cont synthroid 112mcg    #trigeminal neuralgia, seizure disorder  -cont vimpat  -seizure precautions    #tobacco use  -encourage tobacco cessation    #DVT ppx  -SCDs    Case d/w Dr Akers and team on IDR. 53 y.o. female with PMH fibromyalgia, hypothyroidism, trigeminal neuralgia, seizure disorder, hx CSF leak, L renal calculus, reflex sympathetic dystrophy presents with abdominal pain since this AM. Pt reports waking up at 4AM with abdominal pain that is supraumbilical and occasional radiation to lower quadrants. States associated nausea and vomiting. Pt reports multiple vomiting episodes and after it, pt's pain improves for 15-20 minutes. States severe sharp pain was worse than when she had her gallbladder removed.    #abdominal pain, nausea/vomiting 2ndry to possible ?gastritis  -CT abdomen/pelvis noted  -Lipase and LActate WNL  -MRCP- results unremarkable  -pain control prn  -iv hydration  -PPI Q12H  -liquid diet  -antiemetics  -GI consult appreciated     #Acute back pain  - will check MRI lumbar spine  - pain control    #bandemia- resolved  -unclear source  -CT abd noted, CXR neg, UA neg  -continue Zosyn 3.375mg IV [id number] for now  -f/u cultures  -IV hydration  -ID consult appreciated     #hypothyroidism  -cont synthroid 112mcg    #trigeminal neuralgia, seizure disorder  -cont vimpat  -seizure precautions    #tobacco use  -encourage tobacco cessation    #DVT ppx  -SCDs    Case d/w Dr Akers and team on IDR.

## 2018-02-07 NOTE — PROVIDER CONTACT NOTE (OTHER) - ACTION/TREATMENT ORDERED:
give 500cc bolus over 1 hour. RN informed CICI Hansen of protocol for rapid response. Rapid response not called. PA told RN to recheck vitals in 1 hour after bolus

## 2018-02-07 NOTE — CONSULT NOTE ADULT - SUBJECTIVE AND OBJECTIVE BOX
Patient is a 53y old  Female who presents with a chief complaint of abdominal pain (2018 19:44)    HPI:  53 y.o. female with PMH fibromyalgia, hypothyroidism, trigeminal neuralgia, seizure disorder, hx CSF leak, L renal calculus, reflex sympathetic dystrophy presents with abdominal pain since this AM. Pt reports  abdominal pain that is supraumbilical w/ occasional radiation to lower quadrants which is associated with nausea and vomiting.    PMH: as above  PSH: c section, cholecystectomy, hysterectomy, s/p craniotomy 2013  Social Hx: smokes 3 cigarettes daily, denies EtOH or drugs  Family Hx: pt denies  ROS: per HPI (2018 19:44)    PAST MEDICAL & SURGICAL HISTORY:  Fibromyalgia  CSF leak  Reflex sympathetic dystrophy of the arm: left arm  Renal calculus: left kidney  H/O: hypothyroidism  Trigeminal neuralgia  S/P craniotomy: microvascular decompression in 2013  History of cholecystectomy  H/O total hysterectomy  History of     MEDICATIONS  (STANDING):  influenza   Vaccine 0.5 milliLiter(s) IntraMuscular once  lacosamide 150 milliGRAM(s) Oral two times a day  levothyroxine 112 MICROGram(s) Oral daily  pantoprazole  Injectable 40 milliGRAM(s) IV Push daily  piperacillin/tazobactam IVPB. 3.375 Gram(s) IV Intermittent every 8 hours  potassium chloride    Tablet ER 40 milliEquivalent(s) Oral once  sodium chloride 0.9%. 1000 milliLiter(s) (100 mL/Hr) IV Continuous <Continuous>    MEDICATIONS  (PRN):  acetaminophen   Tablet 650 milliGRAM(s) Oral every 6 hours PRN For Temp greater than 38 C (100.4 F), mild pain  docusate sodium 100 milliGRAM(s) Oral two times a day PRN Constipation  morphine  - Injectable 2 milliGRAM(s) IV Push every 6 hours PRN Severe Pain (7 - 10)  ondansetron Injectable 4 milliGRAM(s) IV Push every 6 hours PRN Nausea    Allergies    garlic (Anaphylaxis)  Levaquin (Pruritus; Rash)    REVIEW OF SYSTEMS:  CONSTITUTIONAL: weakness no fevers or chills.   EYES/ENT: No visual changes;  No vertigo or throat pain   NECK: No pain or stiffness  RESPIRATORY: No cough, wheezing, hemoptysis; No shortness of breath  CARDIOVASCULAR: No chest pain or palpitations  GASTROINTESTINAL: supraumblical pain with radiation to back, + nausea. No hematemesis;   PSYCH: Normal mood and affect  All other review of systems is negative unless indicated above.    Vital Signs Last 24 Hrs  T(C): 36.8 (2018 05:35), Max: 36.9 (2018 20:45)  T(F): 98.2 (2018 05:35), Max: 98.4 (2018 20:45)  HR: 54 (2018 05:35) (54 - 88)  BP: 94/50 (2018 06:24) (76/43 - 113/67)  BP(mean): --  RR: 19 (2018 05:35) (16 - 22)  SpO2: 98% (2018 05:35) (98% - 100%)    PHYSICAL EXAM:  Constitutional: appears uncomfortable.  Respiratory: CTAB  Cardiovascular: S1 and S2, RRR, no M/G/R  Gastrointestinal: supraumbilical tenderness w/ radiation to her back, +BS,   Extremities: No peripheral edema  Neurological: A/O x 3, no focal deficits  Psychiatric: Normal mood, normal affect    LABS:                        11.4   3.0   )-----------( 146      ( 2018 06:45 )             35.8     02-07    141  |  111<H>  |  10  ----------------------------<  80  3.3<L>   |  23  |  0.78    Ca    7.5<L>      2018 06:45    TPro  7.9  /  Alb  4.1  /  TBili  0.9  /  DBili  x   /  AST  22  /  ALT  15  /  AlkPhos  72  02-06      LIVER FUNCTIONS - ( 2018 14:00 )  Alb: 4.1 g/dL / Pro: 7.9 gm/dL / ALK PHOS: 72 U/L / ALT: 15 U/L / AST: 22 U/L / GGT: x             RADIOLOGY & ADDITIONAL STUDIES:

## 2018-02-08 LAB
AMYLASE P1 CFR SERPL: 50 U/L — SIGNIFICANT CHANGE UP (ref 25–115)
ANION GAP SERPL CALC-SCNC: 4 MMOL/L — LOW (ref 5–17)
BASOPHILS # BLD AUTO: 0.1 K/UL — SIGNIFICANT CHANGE UP (ref 0–0.2)
BASOPHILS NFR BLD AUTO: 2.8 % — HIGH (ref 0–2)
BUN SERPL-MCNC: 5 MG/DL — LOW (ref 7–23)
CALCIUM SERPL-MCNC: 7.4 MG/DL — LOW (ref 8.5–10.1)
CHLORIDE SERPL-SCNC: 115 MMOL/L — HIGH (ref 96–108)
CO2 SERPL-SCNC: 24 MMOL/L — SIGNIFICANT CHANGE UP (ref 22–31)
CREAT SERPL-MCNC: 0.68 MG/DL — SIGNIFICANT CHANGE UP (ref 0.5–1.3)
EOSINOPHIL # BLD AUTO: 0 K/UL — SIGNIFICANT CHANGE UP (ref 0–0.5)
EOSINOPHIL NFR BLD AUTO: 1.8 % — SIGNIFICANT CHANGE UP (ref 0–6)
GLUCOSE SERPL-MCNC: 85 MG/DL — SIGNIFICANT CHANGE UP (ref 70–99)
HCT VFR BLD CALC: 33.6 % — LOW (ref 34.5–45)
HGB BLD-MCNC: 10.8 G/DL — LOW (ref 11.5–15.5)
LG PLATELETS BLD QL AUTO: SLIGHT — SIGNIFICANT CHANGE UP
LIDOCAIN IGE QN: 209 U/L — SIGNIFICANT CHANGE UP (ref 73–393)
LYMPHOCYTES # BLD AUTO: 0.6 K/UL — LOW (ref 1–3.3)
LYMPHOCYTES # BLD AUTO: 27.2 % — SIGNIFICANT CHANGE UP (ref 13–44)
MAGNESIUM SERPL-MCNC: 1.7 MG/DL — SIGNIFICANT CHANGE UP (ref 1.6–2.6)
MANUAL DIF COMMENT BLD-IMP: SIGNIFICANT CHANGE UP
MCHC RBC-ENTMCNC: 30.1 PG — SIGNIFICANT CHANGE UP (ref 27–34)
MCHC RBC-ENTMCNC: 32.2 GM/DL — SIGNIFICANT CHANGE UP (ref 32–36)
MCV RBC AUTO: 93.3 FL — SIGNIFICANT CHANGE UP (ref 80–100)
MONOCYTES # BLD AUTO: 0.4 K/UL — SIGNIFICANT CHANGE UP (ref 0–0.9)
MONOCYTES NFR BLD AUTO: 15.2 % — HIGH (ref 2–14)
NEUTROPHILS # BLD AUTO: 1.2 K/UL — LOW (ref 1.8–7.4)
NEUTROPHILS NFR BLD AUTO: 53 % — SIGNIFICANT CHANGE UP (ref 43–77)
PHOSPHATE SERPL-MCNC: 1.8 MG/DL — LOW (ref 2.5–4.5)
PLAT MORPH BLD: NORMAL — SIGNIFICANT CHANGE UP
PLATELET # BLD AUTO: 149 K/UL — LOW (ref 150–400)
POTASSIUM SERPL-MCNC: 3.8 MMOL/L — SIGNIFICANT CHANGE UP (ref 3.5–5.3)
POTASSIUM SERPL-SCNC: 3.8 MMOL/L — SIGNIFICANT CHANGE UP (ref 3.5–5.3)
RBC # BLD: 3.6 M/UL — LOW (ref 3.8–5.2)
RBC # FLD: 12.3 % — SIGNIFICANT CHANGE UP (ref 10.3–14.5)
RBC BLD AUTO: SIGNIFICANT CHANGE UP
SODIUM SERPL-SCNC: 143 MMOL/L — SIGNIFICANT CHANGE UP (ref 135–145)
WBC # BLD: 2.5 K/UL — LOW (ref 3.8–10.5)
WBC # FLD AUTO: 2.5 K/UL — LOW (ref 3.8–10.5)

## 2018-02-08 RX ORDER — SODIUM,POTASSIUM PHOSPHATES 278-250MG
2 POWDER IN PACKET (EA) ORAL ONCE
Qty: 0 | Refills: 0 | Status: COMPLETED | OUTPATIENT
Start: 2018-02-08 | End: 2018-02-08

## 2018-02-08 RX ADMIN — Medication 2 PACKET(S): at 13:43

## 2018-02-08 RX ADMIN — LACOSAMIDE 150 MILLIGRAM(S): 50 TABLET ORAL at 05:56

## 2018-02-08 RX ADMIN — ENOXAPARIN SODIUM 40 MILLIGRAM(S): 100 INJECTION SUBCUTANEOUS at 13:43

## 2018-02-08 RX ADMIN — LACOSAMIDE 150 MILLIGRAM(S): 50 TABLET ORAL at 18:32

## 2018-02-08 RX ADMIN — PIPERACILLIN AND TAZOBACTAM 25 GRAM(S): 4; .5 INJECTION, POWDER, LYOPHILIZED, FOR SOLUTION INTRAVENOUS at 16:45

## 2018-02-08 RX ADMIN — ONDANSETRON 4 MILLIGRAM(S): 8 TABLET, FILM COATED ORAL at 10:15

## 2018-02-08 RX ADMIN — PANTOPRAZOLE SODIUM 40 MILLIGRAM(S): 20 TABLET, DELAYED RELEASE ORAL at 16:50

## 2018-02-08 RX ADMIN — PANTOPRAZOLE SODIUM 40 MILLIGRAM(S): 20 TABLET, DELAYED RELEASE ORAL at 06:02

## 2018-02-08 RX ADMIN — PIPERACILLIN AND TAZOBACTAM 25 GRAM(S): 4; .5 INJECTION, POWDER, LYOPHILIZED, FOR SOLUTION INTRAVENOUS at 05:55

## 2018-02-08 RX ADMIN — Medication 112 MICROGRAM(S): at 05:56

## 2018-02-08 RX ADMIN — PIPERACILLIN AND TAZOBACTAM 25 GRAM(S): 4; .5 INJECTION, POWDER, LYOPHILIZED, FOR SOLUTION INTRAVENOUS at 23:53

## 2018-02-08 NOTE — PROGRESS NOTE ADULT - ASSESSMENT
53 y.o. female with h/o fibromyalgia, hypothyroidism, trigeminal neuralgia, seizure disorder, hx CSF leak, L renal calculus, reflex sympathetic dystrophy is admitted for abdominal pain, nausea, and vomiting. Pt states the abdominal pain woke her up at 4AM on 2/6/2017. Pt also had nausea, vomiting with the abdominal pain and the vomiting was going on for about 8 hours. Pt reports perfusely sweating after vomiting. Pt states the pain  localized above the umbilical and occasional radiation to lower quadrants. Pt states it was a severe sharp pain which was worse than when she had her gallbladder removed. In the ED, pt was found to have bandemia and was given Vancomycin and Zosyn.       1. Chills. Leukopenia with left shift. Thrombocytopenia. Possible sepsis. Abdominal pain. Likely acute viral gastroenteritis. Back pain improving Lumbar spine DDD.   -chills resolved  -BC x 2 are negative to date  -continue Zosyn 3.375mg IV [id number] for now  -tolerating abx well so far; no side effects noted  -continue IV abx for now; if culture remain negative will plan to d/c abx  -obtain stool GI by PCR is further loose stools  -monitor BMP, vitals    -f/u CBC, BC  -supportive care    2. Other issues:   -care per medicine

## 2018-02-08 NOTE — PROGRESS NOTE ADULT - ASSESSMENT
54yo with abd pain  possibly viral syndrome though pt with bandemia  continue supportive care with iv fluids, replace lytes, abx for now though no clear source  would not yet advance diet

## 2018-02-08 NOTE — PROGRESS NOTE ADULT - ATTENDING COMMENTS
Patient seen and examined with ASHLEY Loza.  Agree with physical exam and assessment and plan.   - case d/w ID and cover with zosyn empirically given pancytopenia.  no clear source this may be myelosuppression 2/2 to likely viral infection which seems to be running its course and pt is improving and tolerating clears.  advance diet  - likely home tomorrow  - zofran prn
Patient was seen and examined by me at bedside with the NP, Magalys Loza.  Plan of care was reviewed and Agree with the assessment and plan.

## 2018-02-08 NOTE — PROGRESS NOTE ADULT - ASSESSMENT
53 y.o. female with PMH fibromyalgia, hypothyroidism, trigeminal neuralgia, seizure disorder, hx CSF leak, L renal calculus, reflex sympathetic dystrophy presents with abdominal pain since this AM. Pt reports waking up at 4AM with abdominal pain that is supraumbilical and occasional radiation to lower quadrants. States associated nausea and vomiting. Pt reports multiple vomiting episodes and after it, pt's pain improves for 15-20 minutes. States severe sharp pain was worse than when she had her gallbladder removed.    #abdominal pain, nausea/vomiting 2ndry to possible ?gastritis  -CT abdomen/pelvis noted  -Lipase and Lactate WNL  -MRCP- results unremarkable  -pain control prn  -iv hydration  -PPI Q12H  -liquid diet  -antiemetics  -GI consult appreciated     #Acute back pain  - MRI lumbar spine results noted  - pain control    #bandemia- resolved  -unclear source  -CT abd noted, CXR neg, UA neg  -continue Zosyn 3.375mg IV [id number], day 2  -f/u cultures  -IV hydration  -ID consult appreciated     #hypothyroidism  -continue synthroid 112mcg    #trigeminal neuralgia, seizure disorder  -continue vimpat  -seizure precautions    #tobacco use  -encourage tobacco cessation    #DVT ppx  -SCDs    Case d/w Dr Marx and team on IDR. 53 y.o. female with PMH fibromyalgia, hypothyroidism, trigeminal neuralgia, seizure disorder, hx CSF leak, L renal calculus, reflex sympathetic dystrophy presents with abdominal pain since this AM. Pt reports waking up at 4AM with abdominal pain that is supraumbilical and occasional radiation to lower quadrants. States associated nausea and vomiting. Pt reports multiple vomiting episodes and after it, pt's pain improves for 15-20 minutes. States severe sharp pain was worse than when she had her gallbladder removed.    #abdominal pain, nausea/vomiting 2ndry to possible ?gastritis  -CT abdomen/pelvis noted  -Lipase and Lactate WNL  -MRCP- results unremarkable  -pain control prn  -iv hydration  -PPI Q12H  -liquid diet  -antiemetics  -GI consult appreciated     #Acute back pain  - MRI lumbar spine results noted  - pain control    #pancytopenia - likely 2/2 virus but cannot be sure its not due to sepsis.  pt looks non toxic and afebrile after d/w ID will empirically cover with zosyn for now   -CT abd noted, CXR neg, UA neg  -continue Zosyn 3.375mg IV [id number], day 2  -f/u cultures  -IV hydration  -ID consult appreciated     #hypothyroidism  -continue synthroid 112mcg    #trigeminal neuralgia, seizure disorder  -continue vimpat  -seizure precautions    #tobacco use  -encourage tobacco cessation    #DVT ppx  -SCDs    Case d/w Dr Marx and team on IDR.

## 2018-02-09 ENCOUNTER — TRANSCRIPTION ENCOUNTER (OUTPATIENT)
Age: 53
End: 2018-02-09

## 2018-02-09 VITALS
SYSTOLIC BLOOD PRESSURE: 96 MMHG | DIASTOLIC BLOOD PRESSURE: 55 MMHG | TEMPERATURE: 98 F | RESPIRATION RATE: 16 BRPM | HEART RATE: 62 BPM | OXYGEN SATURATION: 100 %

## 2018-02-09 LAB
ADD ON TEST-SPECIMEN IN LAB: SIGNIFICANT CHANGE UP
ANION GAP SERPL CALC-SCNC: 6 MMOL/L — SIGNIFICANT CHANGE UP (ref 5–17)
BASOPHILS # BLD AUTO: 0 K/UL — SIGNIFICANT CHANGE UP (ref 0–0.2)
BASOPHILS NFR BLD AUTO: 1 % — SIGNIFICANT CHANGE UP (ref 0–2)
BUN SERPL-MCNC: 3 MG/DL — LOW (ref 7–23)
CALCIUM SERPL-MCNC: 8.2 MG/DL — LOW (ref 8.5–10.1)
CHLORIDE SERPL-SCNC: 110 MMOL/L — HIGH (ref 96–108)
CO2 SERPL-SCNC: 26 MMOL/L — SIGNIFICANT CHANGE UP (ref 22–31)
CREAT SERPL-MCNC: 0.75 MG/DL — SIGNIFICANT CHANGE UP (ref 0.5–1.3)
DACRYOCYTES BLD QL SMEAR: SLIGHT — SIGNIFICANT CHANGE UP
EOSINOPHIL # BLD AUTO: 0 K/UL — SIGNIFICANT CHANGE UP (ref 0–0.5)
EOSINOPHIL NFR BLD AUTO: 1.3 % — SIGNIFICANT CHANGE UP (ref 0–6)
GLUCOSE SERPL-MCNC: 83 MG/DL — SIGNIFICANT CHANGE UP (ref 70–99)
HCT VFR BLD CALC: 34.8 % — SIGNIFICANT CHANGE UP (ref 34.5–45)
HGB BLD-MCNC: 11.3 G/DL — LOW (ref 11.5–15.5)
LYMPHOCYTES # BLD AUTO: 1.1 K/UL — SIGNIFICANT CHANGE UP (ref 1–3.3)
LYMPHOCYTES # BLD AUTO: 36.1 % — SIGNIFICANT CHANGE UP (ref 13–44)
MAGNESIUM SERPL-MCNC: 1.8 MG/DL — SIGNIFICANT CHANGE UP (ref 1.6–2.6)
MANUAL DIF COMMENT BLD-IMP: SIGNIFICANT CHANGE UP
MCHC RBC-ENTMCNC: 29.8 PG — SIGNIFICANT CHANGE UP (ref 27–34)
MCHC RBC-ENTMCNC: 32.4 GM/DL — SIGNIFICANT CHANGE UP (ref 32–36)
MCV RBC AUTO: 91.9 FL — SIGNIFICANT CHANGE UP (ref 80–100)
MONOCYTES # BLD AUTO: 0.3 K/UL — SIGNIFICANT CHANGE UP (ref 0–0.9)
MONOCYTES NFR BLD AUTO: 9.3 % — SIGNIFICANT CHANGE UP (ref 2–14)
NEUTROPHILS # BLD AUTO: 1.6 K/UL — LOW (ref 1.8–7.4)
NEUTROPHILS NFR BLD AUTO: 52.3 % — SIGNIFICANT CHANGE UP (ref 43–77)
OVALOCYTES BLD QL SMEAR: SLIGHT — SIGNIFICANT CHANGE UP
PHOSPHATE SERPL-MCNC: 3.1 MG/DL — SIGNIFICANT CHANGE UP (ref 2.5–4.5)
PLAT MORPH BLD: NORMAL — SIGNIFICANT CHANGE UP
PLATELET # BLD AUTO: 162 K/UL — SIGNIFICANT CHANGE UP (ref 150–400)
POIKILOCYTOSIS BLD QL AUTO: SLIGHT — SIGNIFICANT CHANGE UP
POTASSIUM SERPL-MCNC: 3.6 MMOL/L — SIGNIFICANT CHANGE UP (ref 3.5–5.3)
POTASSIUM SERPL-SCNC: 3.6 MMOL/L — SIGNIFICANT CHANGE UP (ref 3.5–5.3)
RBC # BLD: 3.79 M/UL — LOW (ref 3.8–5.2)
RBC # FLD: 12 % — SIGNIFICANT CHANGE UP (ref 10.3–14.5)
RBC BLD AUTO: (no result)
SCHISTOCYTES BLD QL AUTO: SLIGHT — SIGNIFICANT CHANGE UP
SODIUM SERPL-SCNC: 142 MMOL/L — SIGNIFICANT CHANGE UP (ref 135–145)
WBC # BLD: 3.1 K/UL — LOW (ref 3.8–10.5)
WBC # FLD AUTO: 3.1 K/UL — LOW (ref 3.8–10.5)

## 2018-02-09 RX ORDER — DOCUSATE SODIUM 100 MG
1 CAPSULE ORAL
Qty: 0 | Refills: 0 | COMMUNITY
Start: 2018-02-09

## 2018-02-09 RX ORDER — PANTOPRAZOLE SODIUM 20 MG/1
1 TABLET, DELAYED RELEASE ORAL
Qty: 28 | Refills: 0 | OUTPATIENT
Start: 2018-02-09 | End: 2018-02-22

## 2018-02-09 RX ORDER — ONDANSETRON 8 MG/1
1 TABLET, FILM COATED ORAL
Qty: 21 | Refills: 0 | OUTPATIENT
Start: 2018-02-09 | End: 2018-02-15

## 2018-02-09 RX ADMIN — PIPERACILLIN AND TAZOBACTAM 25 GRAM(S): 4; .5 INJECTION, POWDER, LYOPHILIZED, FOR SOLUTION INTRAVENOUS at 05:43

## 2018-02-09 RX ADMIN — LACOSAMIDE 150 MILLIGRAM(S): 50 TABLET ORAL at 05:39

## 2018-02-09 RX ADMIN — ONDANSETRON 4 MILLIGRAM(S): 8 TABLET, FILM COATED ORAL at 11:21

## 2018-02-09 RX ADMIN — Medication 650 MILLIGRAM(S): at 05:39

## 2018-02-09 RX ADMIN — PANTOPRAZOLE SODIUM 40 MILLIGRAM(S): 20 TABLET, DELAYED RELEASE ORAL at 05:38

## 2018-02-09 RX ADMIN — Medication 112 MICROGRAM(S): at 05:38

## 2018-02-09 NOTE — PROGRESS NOTE ADULT - ASSESSMENT
53 y.o. female with h/o fibromyalgia, hypothyroidism, trigeminal neuralgia, seizure disorder, hx CSF leak, L renal calculus, reflex sympathetic dystrophy is admitted for abdominal pain, nausea, and vomiting. Pt states the abdominal pain woke her up at 4AM on 2/6/2017. Pt also had nausea, vomiting with the abdominal pain and the vomiting was going on for about 8 hours. Pt reports perfusely sweating after vomiting. Pt states the pain  localized above the umbilical and occasional radiation to lower quadrants. Pt states it was a severe sharp pain which was worse than when she had her gallbladder removed. In the ED, pt was found to have bandemia and was given Vancomycin and Zosyn.       1. Chills resolved. Leukopenia improving Thrombocytopenia resolved. Likely acute viral gastroenteritis. Back pain improving Lumbar spine DDD.   -much improved  -doubt sepsis  -BC x 2 are negative to date  -on Zosyn 3.375mg IV [id number] # 2  -tolerating abx well so far; no side effects noted  -d/c further abx coverage  -monitor BMP, vitals    -f/u CBC, BC  -supportive care    2. Other issues:   -care per medicine

## 2018-02-09 NOTE — PROGRESS NOTE ADULT - SUBJECTIVE AND OBJECTIVE BOX
Chief complaint: abdominal pain, nausea, vomiting    HPI: 53 y.o. female with h/o fibromyalgia, hypothyroidism, trigeminal neuralgia, seizure disorder, hx CSF leak, L renal calculus, reflex sympathetic dystrophy was admitted on  for abdominal pain, nausea, and vomiting. Pt states the abdominal pain woke her up at 4AM on 2017. Pt also had nausea, vomiting with the abdominal pain and the vomiting was going on for about 8 hours. Pt reports perfusely sweating after vomiting. Pt states the pain  localized above the umbilical and occasional radiation to lower quadrants. Pt states it was a severe sharp pain which was worse than when she had her gallbladder removed. Pt denies any change in medications or food intake. Pt denies fever, SOB, chest pain, diarrhea. She reports chills and feeling weak.    Abdominal cramps are improving  Still N/V  Had one large loose stool last PM    MEDICATIONS  (STANDING):  enoxaparin Injectable 40 milliGRAM(s) SubCutaneous daily  influenza   Vaccine 0.5 milliLiter(s) IntraMuscular once  lacosamide 150 milliGRAM(s) Oral two times a day  levothyroxine 112 MICROGram(s) Oral daily  pantoprazole    Tablet 40 milliGRAM(s) Oral two times a day before meals  piperacillin/tazobactam IVPB. 3.375 Gram(s) IV Intermittent every 8 hours  potassium phosphate / sodium phosphate powder 2 Packet(s) Oral once  sodium chloride 0.9%. 1000 milliLiter(s) (100 mL/Hr) IV Continuous <Continuous>    MEDICATIONS  (PRN):  acetaminophen   Tablet 650 milliGRAM(s) Oral every 6 hours PRN For Temp greater than 38 C (100.4 F), mild pain  docusate sodium 100 milliGRAM(s) Oral two times a day PRN Constipation  morphine  - Injectable 2 milliGRAM(s) IV Push every 6 hours PRN Severe Pain (7 - 10)  ondansetron Injectable 4 milliGRAM(s) IV Push every 6 hours PRN Nausea      Vital Signs Last 24 Hrs  T(C): 36.8 (2018 11:17), Max: 36.8 (2018 16:08)  T(F): 98.2 (2018 11:17), Max: 98.3 (2018 16:08)  HR: 46 (2018 11:17) (46 - 60)  BP: 93/50 (2018 11:17) (93/50 - 98/53)  BP(mean): --  RR: 19 (2018 11:17) (16 - 19)  SpO2: 100% (2018 11:17) (98% - 100%)    Physical Exam:      Constitutional: comfortable   HEENT: NC/AT, EOMI, PERRLA  Neck: supple  Back: no tenderness  Respiratory: clear  Cardiovascular: S1S2 regular, no murmurs  Abdomen: soft, mild epigastric tenderness, not distended, positive BS  Genitourinary: deferred  Rectal: deferred  Musculoskeletal: no muscle tenderness, no joint swelling or tenderness; mid spine tenderness  Extremities: no pedal edema  Neurological: AxOx3, moving all extremities, no focal deficits  Skin: no rashes    Labs:                        10.8   2.5   )-----------( 149      ( 2018 06:44 )             33.6     02-08    143  |  115<H>  |  5<L>  ----------------------------<  85  3.8   |  24  |  0.68    Ca    7.4<L>      2018 06:44  Phos  1.8     02-08  Mg     1.7     02-08    TPro  7.9  /  Alb  4.1  /  TBili  0.9  /  DBili  x   /  AST  22  /  ALT  15  /  AlkPhos  72  02-06                          11.4   3.0   )-----------( 146      ( 2018 06:45 )             35.8       02-07    141  |  111<H>  |  10  ----------------------------<  80  3.3<L>   |  23  |  0.78    Ca    7.5<L>      2018 06:45    TPro  7.9  /  Alb  4.1  /  TBili  0.9  /  DBili  x   /  AST  22  /  ALT  15  /  AlkPhos  72  02-06      Urinalysis Basic - ( 2018 16:34 )    Color: Yellow / Appearance: Clear / S.005 / pH: x  Gluc: x / Ketone: Small  / Bili: Negative / Urobili: Negative mg/dL   Blood: x / Protein: 15 mg/dL / Nitrite: Negative   Leuk Esterase: Negative / RBC: 3-5 /HPF / WBC 0-2   Sq Epi: x / Non Sq Epi: Many / Bacteria: Occasional        RADIOLOGY:    < from: Xray Chest 1 View AP/PA. (18 @ 16:29) >    EXAM:  XR CHEST AP OR PA 1V                            PROCEDURE DATE:  2018          INTERPRETATION:  History: Abdominal pain    AP radiograph of the chest is performed and compared to 2016.    The cardiomediastinal silhouette is normal. the trachea is midline. There   is no focal infiltrate or pleural effusion. The osseous structures are   unremarkable.    Impression: No active pulmonary disease. No change.        < from: CT Abdomen and Pelvis w/ IV Cont (18 @ 15:08) >    EXAM:  CT ABDOMEN AND PELVIS IC                        < from: MR Lumbar Spine No Cont (18 @ 17:15) >   Disc degeneration at L4-5 with bulge and tiny paramedian   disc herniation and annular tear which abuts the ventral thecal sac.   Facet osteophytic hypertrophy contributes to mild central stenosis at   this level. Mild facet osteophytic hypertrophy also noted at L4-5.    < end of copied text >      PROCEDURE DATE:  2018          INTERPRETATION:  CLINICAL STATEMENT: abdominal pain    TECHNIQUE: CT of the abdomen and pelvis was performed with IV contrast.   Oral contrast  was administered. Approximately 90 cc of Omnipaque 350   administered.    COMPARISON: 2016    FINDINGS:    The lower chest is unremarkable.    The liver is unremarkable. The gallbladder is absent.    The spleen, pancreas and adrenal glands are unremarkable.The kidneys   demonstrate stable small low-attenuation foci in the lower and upper   poles on the left. Nonobstructive stones are  seen in the left kidney.   The fluid-filled bladder appears intact.    There is no bowel obstruction. A normal appendix is seen.    There is no intraperitoneal free air.  There is no free fluid. The aorta   is not aneurysmal.    There is no significant abdominal, retroperitoneal or pelvic   lymphadenopathy. The pelvic structures are unremarkable. The uterus is   notseen and is likely surgically absent.    The osseous structures are unremarkable.    IMPRESSION:    No acute pathology. Punctate nonobstructive left renal calculi are again   identified      < from: MR Lumbar Spine No Cont (18 @ 17:15) >  Disc degeneration at L4-5 with bulge and tiny paramedian   disc herniation and annular tear which abuts the ventral thecal sac.   Facet osteophytic hypertrophy contributes to mild central stenosis at   this level. Mild facet osteophytic hypertrophy also noted at L4-5.    < end of copied text >
53 y.o. female with PMH fibromyalgia, hypothyroidism, trigeminal neuralgia, seizure disorder, hx CSF leak, L renal calculus, reflex sympathetic dystrophy presents with abdominal pain since this AM. Pt reports waking up at 4AM with abdominal pain that is supraumbilical and occasional radiation to lower quadrants. States associated nausea and vomiting. Pt reports multiple vomiting episodes and after it, pt's pain improves for 15-20 minutes. States severe sharp pain was worse than when she had her gallbladder removed. Patient was noted with bandemia- was started on Zosyn empirically.     - still complaining of abdominal pain and back pain. stated that she does not have much of an appetite.  -c/o nausea and back pain. States Zofran helped to improve nausea today.    ICU Vital Signs Last 24 Hrs  T(C): 36.8 (2018 11:17), Max: 36.8 (2018 16:08)  T(F): 98.2 (2018 11:17), Max: 98.3 (2018 16:08)  HR: 46 (2018 11:17) (46 - 60)  BP: 93/50 (2018 11:17) (93/50 - 98/53)  BP(mean): --  ABP: --  ABP(mean): --  RR: 19 (2018 11:17) (16 - 19)  SpO2: 100% (2018 11:17) (98% - 100%)    ROS:   All 10 systems reviewed and found to be negative with the exception of what has been described above.    PE:  Constitutional: NAD, laying in bed  HEENT: NC/AT, EOMI, PERRLA  Neck: supple  Back: no tenderness  Respiratory: LCTA  Cardiovascular: S1S2 regular, no murmurs  Abdomen: soft, LUQ tenderness on palpation- hypoactive BS  Genitourinary: voiding  Rectal: deferred  Musculoskeletal: no muscle tenderness, no joint swelling or tenderness  Extremities: no pedal edema   Neurological: no focal deficits    < from: MR Abdomen w/wo IV Cont (18 @ 11:39) >  IMPRESSION: No significant or acute pathology.    < end of copied text >    < from: MR Lumbar Spine No Cont (18 @ 17:15) >  IMPRESSION:  Disc degeneration at L4-5 with bulge and tiny paramedian   disc herniation and annular tear which abuts the ventral thecal sac.   Facet osteophytic hypertrophy contributes to mild central stenosis at   this level. Mild facet osteophytic hypertrophy also noted at L4-5.    < end of copied text >      Labs:               10.8   2.5   )-----------( 149      ( 2018 06:44 )             33.6     02-08    143  |  115<H>  |  5<L>  ----------------------------<  85  3.8   |  24  |  0.68    Ca    7.4<L>      2018 06:44  Phos  1.8     02-08  Mg     1.7     02-08      Urinalysis Basic - ( 2018 16:34 )  Color: Yellow / Appearance: Clear / S.005 / pH: x  Gluc: x / Ketone: Small  / Bili: Negative / Urobili: Negative mg/dL   Blood: x / Protein: 15 mg/dL / Nitrite: Negative   Leuk Esterase: Negative / RBC: 3-5 /HPF / WBC 0-2   Sq Epi: x / Non Sq Epi: Many / Bacteria: Occasional
53 y.o. female with PMH fibromyalgia, hypothyroidism, trigeminal neuralgia, seizure disorder, hx CSF leak, L renal calculus, reflex sympathetic dystrophy presents with abdominal pain since this AM. Pt reports waking up at 4AM with abdominal pain that is supraumbilical and occasional radiation to lower quadrants. States associated nausea and vomiting. Pt reports multiple vomiting episodes and after it, pt's pain improves for 15-20 minutes. States severe sharp pain was worse than when she had her gallbladder removed. Patient was noted with bandemia- was started on Zosyn empirically.     2/7- still complaining of abdominal pain and back pain. stated that she does not have much of an appetite.    Vital Signs Last 24 Hrs  T(C): 36.8 (07 Feb 2018 05:35), Max: 36.9 (06 Feb 2018 20:45)  T(F): 98.2 (07 Feb 2018 05:35), Max: 98.4 (06 Feb 2018 20:45)  HR: 54 (07 Feb 2018 05:35) (54 - 88)  BP: 94/50 (07 Feb 2018 06:24) (76/43 - 113/67)  BP(mean): --  RR: 19 (07 Feb 2018 05:35) (16 - 22)  SpO2: 98% (07 Feb 2018 05:35) (98% - 100%)    ROS:   All 10 systems reviewed and found to be negative with the exception of what has been described above.      PE:  Constitutional: NAD, laying in bed  HEENT: NC/AT, EOMI, PERRLA  Neck: supple  Back: no tenderness  Respiratory: LCTA  Cardiovascular: S1S2 regular, no murmurs  Abdomen: soft, LUQ tenderness on palpation- hypoactive BS  Genitourinary: voiding  Rectal: deferred  Musculoskeletal: no muscle tenderness, no joint swelling or tenderness  Extremities: no pedal edema   Neurological: no focal deficits    < from: MR Abdomen w/wo IV Cont (02.07.18 @ 11:39) >  IMPRESSION: No significant or acute pathology.    < end of copied text >                            11.4   3.4   )-----------( 142      ( 07 Feb 2018 09:53 )             35.1   02-07    141  |  111<H>  |  10  ----------------------------<  80  3.3<L>   |  23  |  0.78    Ca    7.5<L>      07 Feb 2018 06:45    TPro  7.9  /  Alb  4.1  /  TBili  0.9  /  DBili  x   /  AST  22  /  ALT  15  /  AlkPhos  72  02-06
Patient is a 53y old  Female who presents with a chief complaint of abdominal pain (2018 19:44)      HPI:  53 y.o. female with PMH fibromyalgia, hypothyroidism, trigeminal neuralgia, seizure disorder, hx CSF leak, L renal calculus, reflex sympathetic dystrophy presents with abdominal pain since this AM. Pt reports waking up at 4AM with abdominal pain that is supraumbilical and occasional radiation to lower quadrants. States associated nausea and vomiting. Pt reports multiple vomiting episodes and after it, pt's pain improves for 15-20 minutes. States severe sharp pain was worse than when she had her gallbladder removed. Pt denies any change in medications or food intake. Denies fever. +diaphoresis. No diarrhea or dysuria.     Patient is now pain free. Nausea is not an issue. Some diarrhea yesterday. Low WBC suggestive of viral syndrome. MRCP was normal.    PMH: as above  PSH: c section, cholecystectomy, hysterectomy, s/p craniotomy 2013  Social Hx: smokes 3 cigarettes daily, denies EtOH or drugs  Family Hx: pt denies  ROS: per HPI (2018 19:44)      PAST MEDICAL & SURGICAL HISTORY:  Fibromyalgia  CSF leak  Reflex sympathetic dystrophy of the arm: left arm  Renal calculus: left kidney  H/O: hypothyroidism  Trigeminal neuralgia  S/P craniotomy: microvascular decompression in 2013  History of cholecystectomy  H/O total hysterectomy  History of       MEDICATIONS  (STANDING):  enoxaparin Injectable 40 milliGRAM(s) SubCutaneous daily  influenza   Vaccine 0.5 milliLiter(s) IntraMuscular once  lacosamide 150 milliGRAM(s) Oral two times a day  levothyroxine 112 MICROGram(s) Oral daily  pantoprazole    Tablet 40 milliGRAM(s) Oral two times a day before meals  piperacillin/tazobactam IVPB. 3.375 Gram(s) IV Intermittent every 8 hours  sodium chloride 0.9%. 1000 milliLiter(s) (100 mL/Hr) IV Continuous <Continuous>    MEDICATIONS  (PRN):  acetaminophen   Tablet 650 milliGRAM(s) Oral every 6 hours PRN For Temp greater than 38 C (100.4 F), mild pain  docusate sodium 100 milliGRAM(s) Oral two times a day PRN Constipation  morphine  - Injectable 2 milliGRAM(s) IV Push every 6 hours PRN Severe Pain (7 - 10)  ondansetron Injectable 4 milliGRAM(s) IV Push every 6 hours PRN Nausea      Allergies    Dilaudid (Hives)  garlic (Anaphylaxis)  Levaquin (Pruritus; Rash)    Intolerances        REVIEW OF SYSTEMS:    CONSTITUTIONAL: No weakness, fevers or chills  RESPIRATORY: No cough, wheezing, hemoptysis; No shortness of breath  CARDIOVASCULAR: No chest pain or palpitations  GASTROINTESTINAL: No abdominal or epigastric pain. No nausea, vomiting, or hematemesis; No diarrhea or constipation. No melena or hematochezia.  All other review of systems is negative unless indicated above.    Vital Signs Last 24 Hrs  T(C): 36.6 (2018 05:05), Max: 36.8 (2018 11:17)  T(F): 97.8 (2018 05:05), Max: 98.2 (2018 11:17)  HR: 60 (2018 05:05) (46 - 60)  BP: 92/57 (2018 05:05) (92/57 - 104/52)  BP(mean): --  RR: 17 (2018 05:05) (16 - 19)  SpO2: 99% (2018 05:05) (99% - 100%)    PHYSICAL EXAM:    Constitutional: NAD, well-developed  Respiratory: CTAB  Cardiovascular: S1 and S2, RRR, no M/G/R  Gastrointestinal: BS+, soft, NT/ND  LABS:                        11.3   3.1   )-----------( 162      ( 2018 07:14 )             34.8     02-09    142  |  110<H>  |  3<L>  ----------------------------<  83  3.6   |  26  |  0.75    Ca    8.2<L>      2018 07:14  Phos  1.8     02-08  Mg     1.7     02-08            RADIOLOGY & ADDITIONAL STUDIES:
Patient is a 53y old  Female who presents with a chief complaint of abdominal pain (2018 19:44)      HPI:  pt feeling ok  still with post-prandial abd discomfort and fullness  now with diarrhea    PAST MEDICAL & SURGICAL HISTORY:  Fibromyalgia  CSF leak  Reflex sympathetic dystrophy of the arm: left arm  Renal calculus: left kidney  H/O: hypothyroidism  Trigeminal neuralgia  S/P craniotomy: microvascular decompression in 2013  History of cholecystectomy  H/O total hysterectomy  History of     MEDICATIONS  (STANDING):  enoxaparin Injectable 40 milliGRAM(s) SubCutaneous daily  influenza   Vaccine 0.5 milliLiter(s) IntraMuscular once  lacosamide 150 milliGRAM(s) Oral two times a day  levothyroxine 112 MICROGram(s) Oral daily  pantoprazole    Tablet 40 milliGRAM(s) Oral two times a day before meals  piperacillin/tazobactam IVPB. 3.375 Gram(s) IV Intermittent every 8 hours  potassium phosphate / sodium phosphate powder 2 Packet(s) Oral once  sodium chloride 0.9%. 1000 milliLiter(s) (100 mL/Hr) IV Continuous <Continuous>    MEDICATIONS  (PRN):  acetaminophen   Tablet 650 milliGRAM(s) Oral every 6 hours PRN For Temp greater than 38 C (100.4 F), mild pain  docusate sodium 100 milliGRAM(s) Oral two times a day PRN Constipation  morphine  - Injectable 2 milliGRAM(s) IV Push every 6 hours PRN Severe Pain (7 - 10)  ondansetron Injectable 4 milliGRAM(s) IV Push every 6 hours PRN Nausea    Allergies  Dilaudid (Hives)  garlic (Anaphylaxis)  Levaquin (PREVIEW OF SYSTEMS:    CONSTITUTIONAL: weakness  RESPIRATORY: No cough, wheezing, hemoptysis; No shortness of breath  CARDIOVASCULAR: No chest pain or palpitations  GASTROINTESTINAL: as above  All other review of systems is negative unless indicated above.    Vital Signs Last 24 Hrs  T(C): 36.7 (2018 08:35), Max: 36.8 (2018 16:08)  T(F): 98 (2018 08:35), Max: 98.3 (2018 16:08)  HR: 53 (2018 08:35) (53 - 60)  BP: 98/53 (2018 08:35) (93/54 - 98/53)  BP(mean): --  RR: 17 (2018 08:35) (16 - 18)  SpO2: 100% (2018 08:35) (98% - 100%)    PHYSICAL EXAM:    Constitutional: NAD, well-developed  Respiratory: CTAB  Cardiovascular: S1 and S2, RRR, no M/G/R  Gastrointestinal: BS+, soft, mildly tender epigastrium, LUQ    LABS:                        10.8   2.5   )-----------( 149      ( 2018 06:44 )             33.6     02-08    143  |  115<H>  |  5<L>  ----------------------------<  85  3.8   |  24  |  0.68    Ca    7.4<L>      2018 06:44  Phos  1.8     02-08  Mg     1.7     02-08    TPro  7.9  /  Alb  4.1  /  TBili  0.9  /  DBili  x   /  AST  22  /  ALT  15  /  AlkPhos  72  02-06      LIVER FUNCTIONS - ( 2018 14:00 )  Alb: 4.1 g/dL / Pro: 7.9 gm/dL / ALK PHOS: 72 U/L / ALT: 15 U/L / AST: 22 U/L / GGT: x             RADIOLOGY & ADDITIONAL STUDIES:
Chief complaint: abdominal pain, nausea, vomiting    HPI: 53 y.o. female with h/o fibromyalgia, hypothyroidism, trigeminal neuralgia, seizure disorder, hx CSF leak, L renal calculus, reflex sympathetic dystrophy was admitted on  for abdominal pain, nausea, and vomiting. Pt states the abdominal pain woke her up at 4AM on 2017. Pt also had nausea, vomiting with the abdominal pain and the vomiting was going on for about 8 hours. Pt reports perfusely sweating after vomiting. Pt states the pain  localized above the umbilical and occasional radiation to lower quadrants. Pt states it was a severe sharp pain which was worse than when she had her gallbladder removed. Pt denies any change in medications or food intake. Pt denies fever, SOB, chest pain, diarrhea. She reports chills and feeling weak.    Abdominal pain is much improved;  Had N after eating a muffin  No diarrhea    MEDICATIONS  (STANDING):  enoxaparin Injectable 40 milliGRAM(s) SubCutaneous daily  influenza   Vaccine 0.5 milliLiter(s) IntraMuscular once  lacosamide 150 milliGRAM(s) Oral two times a day  levothyroxine 112 MICROGram(s) Oral daily  pantoprazole    Tablet 40 milliGRAM(s) Oral two times a day before meals  piperacillin/tazobactam IVPB. 3.375 Gram(s) IV Intermittent every 8 hours  sodium chloride 0.9%. 1000 milliLiter(s) (100 mL/Hr) IV Continuous <Continuous>    MEDICATIONS  (PRN):  acetaminophen   Tablet 650 milliGRAM(s) Oral every 6 hours PRN For Temp greater than 38 C (100.4 F), mild pain  docusate sodium 100 milliGRAM(s) Oral two times a day PRN Constipation  morphine  - Injectable 2 milliGRAM(s) IV Push every 6 hours PRN Severe Pain (7 - 10)  ondansetron Injectable 4 milliGRAM(s) IV Push every 6 hours PRN Nausea      Vital Signs Last 24 Hrs  T(C): 36.6 (2018 05:05), Max: 36.8 (2018 21:40)  T(F): 97.8 (2018 05:05), Max: 98.2 (2018 21:40)  HR: 60 (2018 05:05) (50 - 60)  BP: 92/57 (2018 05:05) (92/57 - 104/52)  BP(mean): --  RR: 17 (2018 05:05) (16 - 17)  SpO2: 99% (2018 05:05) (99% - 100%)    Physical Exam:      Constitutional: comfortable   HEENT: NC/AT, EOMI, PERRLA  Neck: supple  Back: no tenderness  Respiratory: clear  Cardiovascular: S1S2 regular, no murmurs  Abdomen: soft, mild epigastric tenderness, not distended, positive BS  Genitourinary: deferred  Rectal: deferred  Musculoskeletal: no muscle tenderness, no joint swelling or tenderness; mid spine tenderness  Extremities: no pedal edema  Neurological: AxOx3, moving all extremities, no focal deficits  Skin: no rashes    Labs:                        11.3   3.1   )-----------( 162      ( 2018 07:14 )             34.8     02-09    142  |  110<H>  |  3<L>  ----------------------------<  83  3.6   |  26  |  0.75    Ca    8.2<L>      2018 07:14  Phos  3.1     02-09  Mg     1.8     02-09                        10.8   2.5   )-----------( 149      ( 2018 06:44 )             33.6     02-08    143  |  115<H>  |  5<L>  ----------------------------<  85  3.8   |  24  |  0.68    Ca    7.4<L>      2018 06:44  Phos  1.8     02-08  Mg     1.7     02-08    TPro  7.9  /  Alb  4.1  /  TBili  0.9  /  DBili  x   /  AST  22  /  ALT  15  /  AlkPhos  72  02-06                          11.4   3.0   )-----------( 146      ( 2018 06:45 )             35.8       02-07    141  |  111<H>  |  10  ----------------------------<  80  3.3<L>   |  23  |  0.78    Ca    7.5<L>      2018 06:45    TPro  7.9  /  Alb  4.1  /  TBili  0.9  /  DBili  x   /  AST  22  /  ALT  15  /  AlkPhos  72  02-06      Urinalysis Basic - ( 2018 16:34 )    Color: Yellow / Appearance: Clear / S.005 / pH: x  Gluc: x / Ketone: Small  / Bili: Negative / Urobili: Negative mg/dL   Blood: x / Protein: 15 mg/dL / Nitrite: Negative   Leuk Esterase: Negative / RBC: 3-5 /HPF / WBC 0-2   Sq Epi: x / Non Sq Epi: Many / Bacteria: Occasional        RADIOLOGY:    < from: Xray Chest 1 View AP/PA. (18 @ 16:29) >    EXAM:  XR CHEST AP OR PA 1V                            PROCEDURE DATE:  2018          INTERPRETATION:  History: Abdominal pain    AP radiograph of the chest is performed and compared to 2016.    The cardiomediastinal silhouette is normal. the trachea is midline. There   is no focal infiltrate or pleural effusion. The osseous structures are   unremarkable.    Impression: No active pulmonary disease. No change.        < from: CT Abdomen and Pelvis w/ IV Cont (18 @ 15:08) >    EXAM:  CT ABDOMEN AND PELVIS IC                        < from: MR Lumbar Spine No Cont (18 @ 17:15) >   Disc degeneration at L4-5 with bulge and tiny paramedian   disc herniation and annular tear which abuts the ventral thecal sac.   Facet osteophytic hypertrophy contributes to mild central stenosis at   this level. Mild facet osteophytic hypertrophy also noted at L4-5.    < end of copied text >      PROCEDURE DATE:  2018          INTERPRETATION:  CLINICAL STATEMENT: abdominal pain    TECHNIQUE: CT of the abdomen and pelvis was performed with IV contrast.   Oral contrast  was administered. Approximately 90 cc of Omnipaque 350   administered.    COMPARISON: 2016    FINDINGS:    The lower chest is unremarkable.    The liver is unremarkable. The gallbladder is absent.    The spleen, pancreas and adrenal glands are unremarkable.The kidneys   demonstrate stable small low-attenuation foci in the lower and upper   poles on the left. Nonobstructive stones are  seen in the left kidney.   The fluid-filled bladder appears intact.    There is no bowel obstruction. A normal appendix is seen.    There is no intraperitoneal free air.  There is no free fluid. The aorta   is not aneurysmal.    There is no significant abdominal, retroperitoneal or pelvic   lymphadenopathy. The pelvic structures are unremarkable. The uterus is   notseen and is likely surgically absent.    The osseous structures are unremarkable.    IMPRESSION:    No acute pathology. Punctate nonobstructive left renal calculi are again   identified      < from: MR Lumbar Spine No Cont (18 @ 17:15) >  Disc degeneration at L4-5 with bulge and tiny paramedian   disc herniation and annular tear which abuts the ventral thecal sac.   Facet osteophytic hypertrophy contributes to mild central stenosis at   this level. Mild facet osteophytic hypertrophy also noted at L4-5.    < end of copied text >
HOSPITALIST PROGRESS NOTE:  SUBJECTIVE:  PCP: PCP: Aric    Chief Complaint: Patient is a 53y old  Female who presents with a chief complaint of abdominal pain (2018 19:44)    HPI:  53 y.o. female with PMH fibromyalgia, hypothyroidism, trigeminal neuralgia, seizure disorder, hx CSF leak, L renal calculus, reflex sympathetic dystrophy presents with abdominal pain since this AM. Pt reports waking up at 4AM with abdominal pain that is supraumbilical and occasional radiation to lower quadrants. States associated nausea and vomiting. Pt reports multiple vomiting episodes and after it, pt's pain improves for 15-20 minutes. States severe sharp pain was worse than when she had her gallbladder removed. Pt denies any change in medications or food intake. Denies fever. +diaphoresis. No diarrhea or dysuria.     :  Above Reviewed.     Allergies:  garlic (Anaphylaxis)  Levaquin (Pruritus; Rash)    REVIEW OF SYSTEMS:  See HPI. All other review of systems is negative unless indicated above.     OBJECTIVE  Physical Exam:  Vital Signs:  Height (cm): 167.64 ( @ 13:13)  Weight (kg): 49.9 ( @ 13:13)  BMI (kg/m2): 17.8 ( @ 13:13)  BSA (m2): 1.55 ( @ 13:13)  Vital Signs Last 24 Hrs  T(C): 36.8 (2018 05:35), Max: 36.9 (2018 20:45)  T(F): 98.2 (2018 05:35), Max: 98.4 (2018 20:45)  HR: 54 (2018 05:35) (54 - 88)  BP: 94/50 (2018 06:24) (76/43 - 113/67)  BP(mean): --  RR: 19 (2018 05:35) (16 - 22)  SpO2: 98% (2018 05:35) (98% - 100%)  I&O's Summary      Constitutional: NAD, awake and alert, well-developed  Neurological: AAO x 3, no focal deficits  HEENT: PERRLA, EOMI, MMM  Neck: Soft and supple, No LAD, No JVD  Respiratory: Breath sounds are clear bilaterally, No wheezing, rales or rhonchi  Cardiovascular: S1 and S2, regular rate and rhythm; no Murmurs, gallops or rubs  Gastrointestinal: Bowel Sounds present, soft, nontender, nondistended, no guarding, no rebound tenderness  Back: No CVA tenderness   Extremities: No peripheral edema  Vascular: 2+ peripheral pulses  Musculoskeletal: 5/5 strength b/l upper and lower extremities  Skin: No rashes  Breast: Deferred  Rectal: Deferred    MEDICATIONS  (STANDING):  influenza   Vaccine 0.5 milliLiter(s) IntraMuscular once  lacosamide 150 milliGRAM(s) Oral two times a day  levothyroxine 112 MICROGram(s) Oral daily  pantoprazole  Injectable 40 milliGRAM(s) IV Push daily  piperacillin/tazobactam IVPB. 3.375 Gram(s) IV Intermittent every 8 hours  sodium chloride 0.9%. 1000 milliLiter(s) (100 mL/Hr) IV Continuous <Continuous>      LABS: All Labs Reviewed:                        15.1   7.7   )-----------( 223      ( 2018 14:00 )             46.6     02-06    139  |  109<H>  |  17  ----------------------------<  95  3.9   |  22  |  1.01    Ca    9.0      2018 14:00    TPro  7.9  /  Alb  4.1  /  TBili  0.9  /  DBili  x   /  AST  22  /  ALT  15  /  AlkPhos  72  02-06    Urinalysis Basic - ( 2018 16:34 )    Color: Yellow / Appearance: Clear / S.005 / pH: x  Gluc: x / Ketone: Small  / Bili: Negative / Urobili: Negative mg/dL   Blood: x / Protein: 15 mg/dL / Nitrite: Negative   Leuk Esterase: Negative / RBC: 3-5 /HPF / WBC 0-2   Sq Epi: x / Non Sq Epi: Many / Bacteria: Occasional      RADIOLOGY/EKG:    < from: Xray Chest 1 View AP/PA. (18 @ 16:29) >    Impression: No active pulmonary disease. No change.    < end of copied text >    < from: CT Abdomen and Pelvis w/ IV Cont (18 @ 15:08) >    IMPRESSION:    No acute pathology. Punctate nonobstructive left renal calculi are again   identified    < end of copied text >

## 2018-02-09 NOTE — DISCHARGE NOTE ADULT - MEDICATION SUMMARY - MEDICATIONS TO TAKE
I will START or STAY ON the medications listed below when I get home from the hospital:    Vimpat 150 mg oral tablet  -- 1 tab(s) by mouth 2 times a day  -- Indication: For seizures    Zofran ODT 4 mg oral tablet, disintegrating  -- 1 tab(s) by mouth 3 times a day   -- Indication: For nausea    docusate sodium 100 mg oral capsule  -- 1 cap(s) by mouth 2 times a day, As needed, Constipation  -- Indication: For constipation    pantoprazole 40 mg oral delayed release tablet  -- 1 tab(s) by mouth 2 times a day (before meals)  -- Indication: For PPI    Vivelle 0.075 mg/24 hours twice weekly transdermal film, extended release  -- 1 patch by transdermal patch 2 times a week on MON + THURS  -- Indication: For home meds    Synthroid 112 mcg (0.112 mg) oral tablet  -- 1 tab(s) by mouth once a day  -- Indication: For thyroid hormone

## 2018-02-09 NOTE — DISCHARGE NOTE ADULT - CARE PROVIDER_API CALL
Kristopher Corbett (MD), Cardiovascular Disease; Internal Medicine  [address] [place] [address]  Phone: [phone]  Fax: [phone]

## 2018-02-09 NOTE — DISCHARGE NOTE ADULT - HOSPITAL COURSE
53 y.o. female with PMH fibromyalgia, hypothyroidism, trigeminal neuralgia, seizure disorder, hx CSF leak, L renal calculus, reflex sympathetic dystrophy presents with abdominal pain since this AM. Pt reports waking up at 4AM with abdominal pain that is supraumbilical and occasional radiation to lower quadrants. States associated nausea and vomiting. Pt reports multiple vomiting episodes and after it, pt's pain improves for 15-20 minutes. States severe sharp pain was worse than when she had her gallbladder removed. Patient was noted with bandemia- was started on Zosyn empirically. Patient was started on Clear liquid diet which has since been advanced. Imaging was negative for intraabdominal pathology.     2/9- patient was seen and examined- stated that she felt better this morning- was able to tolerate  soup for lunch - still complaining of mild epigastric soreness.    Vital Signs Last 24 Hrs  T(C): 36.7 (09 Feb 2018 11:35), Max: 36.8 (08 Feb 2018 21:40)  T(F): 98.1 (09 Feb 2018 11:35), Max: 98.2 (08 Feb 2018 21:40)  HR: 62 (09 Feb 2018 11:35) (50 - 62)  BP: 96/55 (09 Feb 2018 11:35) (92/57 - 104/52)  BP(mean): --  RR: 16 (09 Feb 2018 11:35) (16 - 17)  SpO2: 100% (09 Feb 2018 11:35) (99% - 100%)    ROS:   All 10 systems reviewed and found to be negative with the exception of what has been described above.    PE:  Constitutional: NAD, laying in bed  HEENT: NC/AT, EOMI, PERRLA  Neck: supple  Back: no tenderness  Respiratory: LCTA  Cardiovascular: S1S2 regular, no murmurs  Abdomen: soft, not tender, not distended, positive BS (hypoactive)  Genitourinary: voiding  Rectal: deferred  Musculoskeletal: no muscle tenderness, no joint swelling or tenderness  Extremities: no pedal edema   Neurological: no focal deficits    02-09    142  |  110<H>  |  3<L>  ----------------------------<  83  3.6   |  26  |  0.75    Ca    8.2<L>      09 Feb 2018 07:14  Phos  3.1     02-09  Mg     1.8     02-09                          11.3   3.1   )-----------( 162      ( 09 Feb 2018 07:14 )             34.8     PLAN:  abdominal pain, nausea/vomiting 2ndry to possible ?gastritis  -CT abdomen/pelvis noted  -Lipase and Lactate WNL  -MRCP- results unremarkable  -pain control prn  -PPI Q12H  - tolerating diet as per report  -antiemetics as an outpatient  -GI consult appreciated     #Acute back pain  - MRI lumbar spine results noted- disc degeneration at L4-L5 with disc herniation  - pain control    #pancytopenia - likely 2/2 virus but cannot be sure its not due to sepsis.  pt looks non toxic and afebrile after d/w ID will empirically cover with zosyn for now - discontinued   -CT abd noted, CXR neg, UA neg  - s/p zosyn  -f/u cultures- NGTD  -ID consult appreciated     #hypothyroidism  -continue synthroid 112mcg    #trigeminal neuralgia, seizure disorder  -continue vimpat  -seizure precautions    #tobacco use  -encourage tobacco cessation    Plan for discharge today- with instructions to go back to the ED should she develop intractable pain, fever or chills.   Case d/w team ON IDR.

## 2018-02-09 NOTE — DISCHARGE NOTE ADULT - CARE PLAN
Principal Discharge DX:	Bandemia  Goal:	likely related to viral gastroenteritis  Assessment and plan of treatment:	continue antinausea medication  advance diet as tolerated   please go back to the ED should you develop intractable abdominal pain, fever, nausea and vomiting.  Please make an appointment to be seen by your PCP next week  repeat CBC as an outpatient.

## 2018-02-09 NOTE — DISCHARGE NOTE ADULT - INSTRUCTIONS
Return to ER or call MD if you experience fever greater than 101, increased abdominal pain, nausea and vomiting. Continue medications as prescribed and follow up with doctors as instructed.

## 2018-02-09 NOTE — PROGRESS NOTE ADULT - PROVIDER SPECIALTY LIST ADULT
Gastroenterology
Gastroenterology
Hospitalist
Hospitalist
Infectious Disease
Infectious Disease
Hospitalist

## 2018-02-09 NOTE — DISCHARGE NOTE ADULT - PLAN OF CARE
likely related to viral gastroenteritis continue antinausea medication  advance diet as tolerated   please go back to the ED should you develop intractable abdominal pain, fever, nausea and vomiting.  Please make an appointment to be seen by your PCP next week  repeat CBC as an outpatient.

## 2018-02-09 NOTE — PROGRESS NOTE ADULT - ASSESSMENT
54 yo female with probable gastroenteritis, now resolving. Will advance to regular diet as tolerated. Will be happy to follow up as outpatient - thanks!

## 2018-02-09 NOTE — DISCHARGE NOTE ADULT - PATIENT PORTAL LINK FT
You can access the [facility] Portal, offered by [facility], by registering with the following website: [email or web address]

## 2018-02-12 LAB
CULTURE RESULTS: SIGNIFICANT CHANGE UP
CULTURE RESULTS: SIGNIFICANT CHANGE UP
SPECIMEN SOURCE: SIGNIFICANT CHANGE UP
SPECIMEN SOURCE: SIGNIFICANT CHANGE UP

## 2018-02-17 DIAGNOSIS — Z88.1 ALLERGY STATUS TO OTHER ANTIBIOTIC AGENTS STATUS: ICD-10-CM

## 2018-02-17 DIAGNOSIS — G90.512 COMPLEX REGIONAL PAIN SYNDROME I OF LEFT UPPER LIMB: ICD-10-CM

## 2018-02-17 DIAGNOSIS — K29.70 GASTRITIS, UNSPECIFIED, WITHOUT BLEEDING: ICD-10-CM

## 2018-02-17 DIAGNOSIS — M79.7 FIBROMYALGIA: ICD-10-CM

## 2018-02-17 DIAGNOSIS — G50.0 TRIGEMINAL NEURALGIA: ICD-10-CM

## 2018-02-17 DIAGNOSIS — G40.909 EPILEPSY, UNSPECIFIED, NOT INTRACTABLE, WITHOUT STATUS EPILEPTICUS: ICD-10-CM

## 2018-02-17 DIAGNOSIS — Z91.018 ALLERGY TO OTHER FOODS: ICD-10-CM

## 2018-02-17 DIAGNOSIS — F17.210 NICOTINE DEPENDENCE, CIGARETTES, UNCOMPLICATED: ICD-10-CM

## 2018-02-17 DIAGNOSIS — A08.4 VIRAL INTESTINAL INFECTION, UNSPECIFIED: ICD-10-CM

## 2018-02-17 DIAGNOSIS — Z87.442 PERSONAL HISTORY OF URINARY CALCULI: ICD-10-CM

## 2018-02-17 DIAGNOSIS — E03.9 HYPOTHYROIDISM, UNSPECIFIED: ICD-10-CM

## 2018-04-30 ENCOUNTER — APPOINTMENT (OUTPATIENT)
Dept: NEUROLOGY | Facility: CLINIC | Age: 53
End: 2018-04-30
Payer: COMMERCIAL

## 2018-04-30 VITALS
DIASTOLIC BLOOD PRESSURE: 75 MMHG | BODY MASS INDEX: 17.58 KG/M2 | SYSTOLIC BLOOD PRESSURE: 112 MMHG | HEIGHT: 67 IN | WEIGHT: 112 LBS | HEART RATE: 58 BPM

## 2018-04-30 PROCEDURE — 99214 OFFICE O/P EST MOD 30 MIN: CPT

## 2018-05-04 ENCOUNTER — TRANSCRIPTION ENCOUNTER (OUTPATIENT)
Age: 53
End: 2018-05-04

## 2018-05-09 ENCOUNTER — TRANSCRIPTION ENCOUNTER (OUTPATIENT)
Age: 53
End: 2018-05-09

## 2018-05-09 ENCOUNTER — MEDICATION RENEWAL (OUTPATIENT)
Age: 53
End: 2018-05-09

## 2018-06-16 ENCOUNTER — MEDICATION RENEWAL (OUTPATIENT)
Age: 53
End: 2018-06-16

## 2018-06-18 ENCOUNTER — TRANSCRIPTION ENCOUNTER (OUTPATIENT)
Age: 53
End: 2018-06-18

## 2018-06-18 ENCOUNTER — MEDICATION RENEWAL (OUTPATIENT)
Age: 53
End: 2018-06-18

## 2018-08-13 ENCOUNTER — RESULT REVIEW (OUTPATIENT)
Age: 53
End: 2018-08-13

## 2018-10-08 ENCOUNTER — TRANSCRIPTION ENCOUNTER (OUTPATIENT)
Age: 53
End: 2018-10-08

## 2018-10-24 ENCOUNTER — MEDICATION RENEWAL (OUTPATIENT)
Age: 53
End: 2018-10-24

## 2018-11-05 ENCOUNTER — APPOINTMENT (OUTPATIENT)
Dept: NEUROLOGY | Facility: CLINIC | Age: 53
End: 2018-11-05
Payer: COMMERCIAL

## 2018-11-05 VITALS
WEIGHT: 110 LBS | HEIGHT: 67 IN | BODY MASS INDEX: 17.27 KG/M2 | SYSTOLIC BLOOD PRESSURE: 111 MMHG | HEART RATE: 60 BPM | DIASTOLIC BLOOD PRESSURE: 76 MMHG

## 2018-11-05 PROCEDURE — 99214 OFFICE O/P EST MOD 30 MIN: CPT

## 2018-11-05 RX ORDER — LACOSAMIDE 150 MG/1
150 TABLET, FILM COATED ORAL
Qty: 120 | Refills: 0 | Status: DISCONTINUED | COMMUNITY
Start: 2017-01-23 | End: 2018-11-05

## 2018-11-05 RX ORDER — ZONISAMIDE 50 MG/1
50 CAPSULE ORAL
Qty: 120 | Refills: 0 | Status: DISCONTINUED | COMMUNITY
Start: 2018-04-30 | End: 2018-11-05

## 2018-12-28 ENCOUNTER — MEDICATION RENEWAL (OUTPATIENT)
Age: 53
End: 2018-12-28

## 2019-01-30 ENCOUNTER — MEDICATION RENEWAL (OUTPATIENT)
Age: 54
End: 2019-01-30

## 2019-01-30 RX ORDER — LACOSAMIDE 200 MG/1
200 TABLET, FILM COATED ORAL
Qty: 60 | Refills: 5 | Status: DISCONTINUED | COMMUNITY
Start: 2018-11-05 | End: 2019-01-30

## 2019-08-13 ENCOUNTER — MEDICATION RENEWAL (OUTPATIENT)
Age: 54
End: 2019-08-13

## 2020-03-12 ENCOUNTER — APPOINTMENT (OUTPATIENT)
Dept: NEUROLOGY | Facility: CLINIC | Age: 55
End: 2020-03-12
Payer: COMMERCIAL

## 2020-03-12 VITALS
BODY MASS INDEX: 17.74 KG/M2 | DIASTOLIC BLOOD PRESSURE: 79 MMHG | HEART RATE: 60 BPM | SYSTOLIC BLOOD PRESSURE: 122 MMHG | WEIGHT: 113 LBS | HEIGHT: 67 IN

## 2020-03-12 PROCEDURE — 99214 OFFICE O/P EST MOD 30 MIN: CPT

## 2020-03-12 NOTE — REVIEW OF SYSTEMS
[Tingling] : tingling [Seizures] : convulsions [Dizziness] : dizziness [Lightheadedness] : lightheadedness [Tension Headache] : tension-type headaches [As Noted in HPI] : as noted in HPI [Negative] : Endocrine [Confused or Disoriented] : no confusion [Memory Lapses or Loss] : no memory loss [Decr. Concentrating Ability] : no decrease in concentrating ability [Difficulty with Language] : no ~M difficulty with language [Changed Thought Patterns] : no change in thought patterns [Repeating Questions] : no repeated questioning about recent events [Facial Weakness] : no facial weakness [Arm Weakness] : no arm weakness [Hand Weakness] : no hand weakness [Leg Weakness] : no leg weakness [Poor Coordination] : good coordination [Difficulty Writing] : no difficulty writing [Difficulties in Speech] : no speech difficulties [Numbness] : no numbness [Abnormal Sensation] : no abnormal sensation [Hypersensitivity] : no hypersensitivity [Fainting] : no fainting [Vertigo] : no vertigo [Cluster Headache] : no cluster headache [Migraine Headache] : no migraine headache [Difficulty Walking] : no difficulty walking [Inability to Walk] : able to walk [Ataxia] : no ataxia [Frequent Falls] : not falling [Limping] : not limping [Suicidal] : not suicidal [Anxiety] : no anxiety [Depression] : no depression [Change In Personality] : no personality change [Emotional Problems] : no emotional problems

## 2020-03-12 NOTE — PHYSICAL EXAM
[General Appearance - Alert] : alert [General Appearance - In No Acute Distress] : in no acute distress [Oriented To Time, Place, And Person] : oriented to person, place, and time [Impaired Insight] : insight and judgment were intact [Affect] : the affect was normal [Sclera] : the sclera and conjunctiva were normal [PERRL With Normal Accommodation] : pupils were equal in size, round, reactive to light, with normal accommodation [Extraocular Movements] : extraocular movements were intact [Full Pulse] : the pedal pulses are present [Involuntary Movements] : no involuntary movements were seen [FreeTextEntry1] : Cast on Left leg

## 2020-03-12 NOTE — DISCUSSION/SUMMARY
[FreeTextEntry1] : Update: Doing well, no seizures for over one year (lst vent triggered by sleep deprivation\par mood good\par \par 52 year old RH dominant woman here for evaluation of two possible seizure events, generalized with LOC on both events and post-ictal confusion and lethargy- focal seizures w sec generalization, focal seizure w dyscognitive features or primary gen seizures? EEG + R temp sharp wave dcs \par \par \par Plan\par - will increase Vimpt to 200mg BID tempporarily as she may be more stressed/sleep  deprived  \par - reviewed seizure triggers i.e sleep deprivation  \par - Exercise RX meditation ordered\par - will have Gaurav Recio ( ) reach out to her\par patient given info for patient assistance program [id number] for Vimpat\par - follow up in 4  months\par

## 2020-03-12 NOTE — DATA REVIEWED
[de-identified] : Personally reviewed MRI from October 2015.  No abnormal findings on my review - does not have report [de-identified] : REEG from Sept 2015 normal.  Had AEEG done Oct 2015, report not yet available.
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
## 2020-09-16 ENCOUNTER — APPOINTMENT (OUTPATIENT)
Dept: MRI IMAGING | Facility: CLINIC | Age: 55
End: 2020-09-16
Payer: COMMERCIAL

## 2020-09-16 ENCOUNTER — OUTPATIENT (OUTPATIENT)
Dept: OUTPATIENT SERVICES | Facility: HOSPITAL | Age: 55
LOS: 1 days | End: 2020-09-16
Payer: COMMERCIAL

## 2020-09-16 DIAGNOSIS — Z00.8 ENCOUNTER FOR OTHER GENERAL EXAMINATION: ICD-10-CM

## 2020-09-16 PROCEDURE — 73718 MRI LOWER EXTREMITY W/O DYE: CPT | Mod: 26,RT

## 2020-09-16 PROCEDURE — 73718 MRI LOWER EXTREMITY W/O DYE: CPT

## 2020-09-29 RX ORDER — LACOSAMIDE 150 MG/1
150 TABLET, FILM COATED ORAL
Qty: 30 | Refills: 0 | Status: DISCONTINUED | COMMUNITY
Start: 2019-01-30 | End: 2020-09-29

## 2020-12-07 ENCOUNTER — APPOINTMENT (OUTPATIENT)
Dept: NEUROLOGY | Facility: CLINIC | Age: 55
End: 2020-12-07
Payer: COMMERCIAL

## 2020-12-07 ENCOUNTER — NON-APPOINTMENT (OUTPATIENT)
Age: 55
End: 2020-12-07

## 2020-12-07 VITALS
DIASTOLIC BLOOD PRESSURE: 82 MMHG | HEIGHT: 67 IN | WEIGHT: 112 LBS | SYSTOLIC BLOOD PRESSURE: 134 MMHG | HEART RATE: 64 BPM | BODY MASS INDEX: 17.58 KG/M2

## 2020-12-07 VITALS — TEMPERATURE: 96.5 F

## 2020-12-07 PROCEDURE — 99214 OFFICE O/P EST MOD 30 MIN: CPT

## 2020-12-07 PROCEDURE — 99072 ADDL SUPL MATRL&STAF TM PHE: CPT

## 2020-12-28 NOTE — ED ADULT NURSE NOTE - GENITOURINARY WDL
SUBJECTIVE     patient seen sitting in the chair and still getting fluids iv   says has one episode of small amount blood per rectum   not seen by the G.I   has some sob with the exertion  Coumadin was on hold      12/27/20   patient was seen by the G.I recommended out patient colonoscopy   no active bleeding now   she denies any sob or new symptoms   urine culture poly microbial no urinary symptoms now   has brown stools     12/28/20- no further bleeding. Wants to go home    Vital Signs Last 24 Hrs  T(C): 36.2 (28 Dec 2020 09:11), Max: 36.8 (27 Dec 2020 20:08)  T(F): 97.2 (28 Dec 2020 09:11), Max: 98.2 (27 Dec 2020 20:08)  HR: 50 (28 Dec 2020 09:11) (50 - 107)  BP: 133/73 (28 Dec 2020 09:11) (133/73 - 142/70)  BP(mean): --  RR: 18 (28 Dec 2020 09:11) (16 - 18)  SpO2: 98% (28 Dec 2020 09:11) (98% - 98%)      Review of systems   as dictated in the history of present illness with the review of other systems non contributory     PHYSICAL EXAM:  Constitutional: , awake and alert, not in distress.  HEENT: Normo cephalic atraumatic  Neck: Soft and supple, No J.V.D   Respiratory: vesicular breathing ,has few minimal rales over the bases with the decreased breath sounds in the base left side   Cardiovascular: S1 and S2, regular rate .   Gastrointestinal:  soft, nontender,   Extremities: has leg edema with no calf tenderness   Neurological: No new  focal deficits.    LABS:                        10.2   6.98  )-----------( 254      ( 28 Dec 2020 06:36 )             32.2     28 Dec 2020 06:36    141    |  106    |  28     ----------------------------<  89     4.3     |  31     |  1.11     Ca    9.9        28 Dec 2020 06:36    PT/INR - ( 27 Dec 2020 06:25 )   PT: 15.5 sec;   INR: 1.34 ratio       < from: Xray Chest 1 View- PORTABLE-Urgent (Xray Chest 1 View- PORTABLE-Urgent .) (12.25.20 @ 10:50) >  XAM:  XR CHEST PORTABLE URGENT 1V                            PROCEDURE DATE:  12/25/2020          INTERPRETATION:  Clinical Information: Chest pain    Technique: AP chest image.    Comparison: 12/25/2019    Findings/  Impression: Cardiomegaly. Trace left pleural effusion      < end of copied text >    MEDICATIONS  (STANDING):  amLODIPine   Tablet 2.5 milliGRAM(s) Oral daily  furosemide    Tablet 20 milliGRAM(s) Oral daily  metoprolol tartrate 100 milliGRAM(s) Oral at bedtime  warfarin 6 milliGRAM(s) Oral at bedtime    MEDICATIONS  (PRN):  acetaminophen   Tablet .. 650 milliGRAM(s) Oral every 6 hours PRN Temp greater or equal to 38.5C (101.3F), Mild Pain (1 - 3)  aluminum hydroxide/magnesium hydroxide/simethicone Suspension 30 milliLiter(s) Oral every 4 hours PRN Dyspepsia  ondansetron Injectable 4 milliGRAM(s) IV Push every 6 hours PRN Nausea             SUBJECTIVE     patient seen sitting in the chair and still getting fluids iv   says has one episode of small amount blood per rectum   not seen by the G.I   has some sob with the exertion  Coumadin was on hold        PAST MEDICAL & SURGICAL HISTORY:  HTN (hypertension)    Breast CA    Afib    lung cancer     No significant past surgical history      OBJECTIVE   Vital Signs Last 24 Hrs  T(C): 36.7 (26 Dec 2020 09:51), Max: 36.7 (26 Dec 2020 09:51)  T(F): 98.1 (26 Dec 2020 09:51), Max: 98.1 (26 Dec 2020 09:51)  HR: 114 (26 Dec 2020 09:51) (51 - 114)  BP: 143/79 (26 Dec 2020 09:51) (107/66 - 143/79)  BP(mean): 94 (26 Dec 2020 09:51) (94 - 94)  RR: 16 (26 Dec 2020 09:51) (16 - 19)  SpO2: 96% (26 Dec 2020 09:51) (94% - 98%)    Review of systems   as dictated in the history of present illness with the review of other systems non contributory     PHYSICAL EXAM:  Constitutional: , awake and alert, not in distress.  HEENT: Normo cephalic atraumatic  Neck: Soft and supple, No J.V.D   Respiratory: vesicular breathing ,has few minimal rales over the bases   Cardiovascular: S1 and S2, regular rate .   Gastrointestinal:  soft, nontender,   Extremities: has leg edema with no calf tenderness   Neurological: No new  focal deficits.    MEDICATIONS  (STANDING):  amLODIPine   Tablet 2.5 milliGRAM(s) Oral daily  metoprolol tartrate 100 milliGRAM(s) Oral at bedtime  metoprolol tartrate 50 milliGRAM(s) Oral daily  sodium chloride 0.9%. 1000 milliLiter(s) (80 mL/Hr) IV Continuous <Continuous>                            10.2   7.10  )-----------( 259      ( 26 Dec 2020 06:25 )             33.0     12-26    144  |  111<H>  |  29<H>  ----------------------------<  97  4.4   |  31  |  1.22    Ca    9.2      26 Dec 2020 06:25    TPro  7.0  /  Alb  3.3  /  TBili  0.5  /  DBili  x   /  AST  20  /  ALT  26  /  AlkPhos  81  12-25         < from: Xray Chest 1 View- PORTABLE-Urgent (Xray Chest 1 View- PORTABLE-Urgent .) (12.25.20 @ 10:50) >  XAM:  XR CHEST PORTABLE URGENT 1V                            PROCEDURE DATE:  12/25/2020          INTERPRETATION:  Clinical Information: Chest pain    Technique: AP chest image.    Comparison: 12/25/2019    Findings/  Impression: Cardiomegaly. Trace left pleural effusion      < end of copied text >               SUBJECTIVE     patient seen sitting in the chair and still getting fluids iv   says has one episode of small amount blood per rectum   not seen by the G.I   has some sob with the exertion  Coumadin was on hold      12/27/20     patient was seen by the G.I recommended out patient colonoscopy   no active bleeding now   she denies any sob or new symptoms   urine culture poly microbial no urinary symptoms now   has brown stools         PAST MEDICAL & SURGICAL HISTORY:  HTN (hypertension)    Breast CA    Afib    lung cancer     No significant past surgical history      OBJECTIVE      Vital Signs Last 24 Hrs  T(C): 36.3 (27 Dec 2020 09:23), Max: 36.9 (26 Dec 2020 20:41)  T(F): 97.3 (27 Dec 2020 09:23), Max: 98.4 (26 Dec 2020 20:41)  HR: 68 (27 Dec 2020 09:30) (58 - 89)  BP: 137/82 (27 Dec 2020 09:30) (119/58 - 141/41)  BP(mean): 67 (27 Dec 2020 09:23) (67 - 67)  ABP: --  ABP(mean): --  RR: 16 (27 Dec 2020 09:23) (16 - 18)  SpO2: 98% (27 Dec 2020 09:23) (98% - 98%)      Review of systems   as dictated in the history of present illness with the review of other systems non contributory     PHYSICAL EXAM:  Constitutional: , awake and alert, not in distress.  HEENT: Normo cephalic atraumatic  Neck: Soft and supple, No J.V.D   Respiratory: vesicular breathing ,has few minimal rales over the bases with the decreased breath sounds in the base left side   Cardiovascular: S1 and S2, regular rate .   Gastrointestinal:  soft, nontender,   Extremities: has leg edema with no calf tenderness   Neurological: No new  focal deficits.    MEDICATIONS  (STANDING):  amLODIPine   Tablet 2.5 milliGRAM(s) Oral daily  metoprolol tartrate 100 milliGRAM(s) Oral at bedtime        Home Medications:  amLODIPine 2.5 mg oral tablet: 1 tab(s) orally once a day (25 Dec 2020 11:37)  Lasix 20 mg oral tablet: 1 tab(s) orally once a day (25 Dec 2020 11:38)  Metoprolol Tartrate 100 mg oral tablet: 1 tab(s) orally once a day (at bedtime) (25 Dec 2020 11:38)  Metoprolol Tartrate 50 mg oral tablet: orally once a day (25 Dec 2020 11:38)                          10.2   8.23  )-----------( 266      ( 27 Dec 2020 06:25 )             32.1   12-27    143  |  110<H>  |  28<H>  ----------------------------<  108<H>  4.7   |  29  |  1.00    Ca    9.8      27 Dec 2020 06:25         < from: Xray Chest 1 View- PORTABLE-Urgent (Xray Chest 1 View- PORTABLE-Urgent .) (12.25.20 @ 10:50) >  XAM:  XR CHEST PORTABLE URGENT 1V                            PROCEDURE DATE:  12/25/2020          INTERPRETATION:  Clinical Information: Chest pain    Technique: AP chest image.    Comparison: 12/25/2019    Findings/  Impression: Cardiomegaly. Trace left pleural effusion      < end of copied text >             Patient is a 89y old  Female who presents with a chief complaint of rectal bleed (27 Dec 2020 10:18)      HPI:  89 y/o female with a PMHx of Afib on Coumadin, HTN, Breast CA, Lung CA, CAD BIBA for rectal bleeding x three days. Pt states that she has been bleeding from her rectum for the past three days. Pt states that yesterday she was trying to have a BM, when a lot of blood "came out" per patient; States the bleeding became worse during the night. Daughter got concerned so she called EMS this morning. Denies abd pain. Last colonoscopy was 2 years ago, pt states was normal. No nausea or vomiting; no dizziness or lightheadedness; no chest pain; no sob; Oncologist: [facility]- finished RXT for lung cancer with good results. (25 Dec 2020 11:00)      pt comf and neg bleeding   had mild reflux last night and now feels well    PAST MEDICAL & SURGICAL HISTORY:  HTN (hypertension)    Breast CA    Afib    No significant past surgical history        MEDICATIONS  (STANDING):  amLODIPine   Tablet 2.5 milliGRAM(s) Oral daily  furosemide    Tablet 20 milliGRAM(s) Oral daily  metoprolol tartrate 100 milliGRAM(s) Oral at bedtime  warfarin 6 milliGRAM(s) Oral at bedtime    MEDICATIONS  (PRN):  acetaminophen   Tablet .. 650 milliGRAM(s) Oral every 6 hours PRN Temp greater or equal to 38.5C (101.3F), Mild Pain (1 - 3)  aluminum hydroxide/magnesium hydroxide/simethicone Suspension 30 milliLiter(s) Oral every 4 hours PRN Dyspepsia  ondansetron Injectable 4 milliGRAM(s) IV Push every 6 hours PRN Nausea      Allergies    Biaxin (Other)  penicillins (Other)    Intolerances        SOCIAL HISTORY:NC    FAMILY HISTORY:NC      REVIEW OF SYSTEMS:    CONSTITUTIONAL: No weakness, fevers or chills  EYES/ENT: No visual changes;  No vertigo or throat pain   NECK: No pain or stiffness  RESPIRATORY: No cough, wheezing, hemoptysis; No shortness of breath  CARDIOVASCULAR: No chest pain or palpitations  GENITOURINARY: No dysuria, frequency or hematuria  NEUROLOGICAL: No numbness or weakness  SKIN: No itching, burning, rashes, or lesions   All other review of systems is negative unless indicated above.    Vital Signs Last 24 Hrs  T(C): 36.8 (27 Dec 2020 20:08), Max: 36.8 (27 Dec 2020 20:08)  T(F): 98.2 (27 Dec 2020 20:08), Max: 98.2 (27 Dec 2020 20:08)  HR: 107 (27 Dec 2020 20:08) (58 - 107)  BP: 142/70 (27 Dec 2020 20:08) (125/64 - 142/70)  BP(mean): 67 (27 Dec 2020 09:23) (67 - 67)  RR: 16 (27 Dec 2020 20:08) (16 - 16)  SpO2: 98% (27 Dec 2020 20:08) (98% - 98%)    PHYSICAL EXAM:    Constitutional: NAD, well-developed  HEENT: EOMI, throat clear  Neck: No LAD, supple  Respiratory: CTA and P  Cardiovascular: S1 and S2, RRR, no M  Gastrointestinal: BS+, soft, NT/ND, neg HSM,  Extremities: No peripheral edema, neg clubing, cyanosis  Vascular: 2+ peripheral pulses  Neurological: A/O x 3, no focal deficits  Psychiatric: Normal mood, normal affect  Skin: No rashes    LABS:  CBC Full  -  ( 27 Dec 2020 06:25 )  WBC Count : 8.23 K/uL  RBC Count : 3.47 M/uL  Hemoglobin : 10.2 g/dL  Hematocrit : 32.1 %  Platelet Count - Automated : 266 K/uL  Mean Cell Volume : 92.5 fl  Mean Cell Hemoglobin : 29.4 pg  Mean Cell Hemoglobin Concentration : 31.8 gm/dL  Auto Neutrophil # : x  Auto Lymphocyte # : x  Auto Monocyte # : x  Auto Eosinophil # : x  Auto Basophil # : x  Auto Neutrophil % : x  Auto Lymphocyte % : x  Auto Monocyte % : x  Auto Eosinophil % : x  Auto Basophil % : x    12-27    143  |  110<H>  |  28<H>  ----------------------------<  108<H>  4.7   |  29  |  1.00    Ca    9.8      27 Dec 2020 06:25      PT/INR - ( 27 Dec 2020 06:25 )   PT: 15.5 sec;   INR: 1.34 ratio                 RADIOLOGY & ADDITIONAL STUDIES:   Bladder non-tender and non-distended. Urine clear yellow.

## 2021-03-03 ENCOUNTER — OUTPATIENT (OUTPATIENT)
Dept: OUTPATIENT SERVICES | Facility: HOSPITAL | Age: 56
LOS: 1 days | End: 2021-03-03
Payer: COMMERCIAL

## 2021-03-03 ENCOUNTER — APPOINTMENT (OUTPATIENT)
Dept: MAMMOGRAPHY | Facility: CLINIC | Age: 56
End: 2021-03-03
Payer: COMMERCIAL

## 2021-03-03 ENCOUNTER — APPOINTMENT (OUTPATIENT)
Dept: ULTRASOUND IMAGING | Facility: CLINIC | Age: 56
End: 2021-03-03
Payer: COMMERCIAL

## 2021-03-03 DIAGNOSIS — Z00.8 ENCOUNTER FOR OTHER GENERAL EXAMINATION: ICD-10-CM

## 2021-03-03 PROCEDURE — 77067 SCR MAMMO BI INCL CAD: CPT | Mod: 26

## 2021-03-03 PROCEDURE — 76641 ULTRASOUND BREAST COMPLETE: CPT

## 2021-03-03 PROCEDURE — 77067 SCR MAMMO BI INCL CAD: CPT

## 2021-03-03 PROCEDURE — 76641 ULTRASOUND BREAST COMPLETE: CPT | Mod: 26,50

## 2021-03-03 PROCEDURE — 77063 BREAST TOMOSYNTHESIS BI: CPT

## 2021-03-03 PROCEDURE — 77063 BREAST TOMOSYNTHESIS BI: CPT | Mod: 26

## 2021-04-05 ENCOUNTER — APPOINTMENT (OUTPATIENT)
Dept: NEUROLOGY | Facility: CLINIC | Age: 56
End: 2021-04-05
Payer: COMMERCIAL

## 2021-04-05 VITALS
SYSTOLIC BLOOD PRESSURE: 100 MMHG | HEIGHT: 67 IN | HEART RATE: 60 BPM | BODY MASS INDEX: 18.83 KG/M2 | WEIGHT: 120 LBS | DIASTOLIC BLOOD PRESSURE: 62 MMHG

## 2021-04-05 VITALS — TEMPERATURE: 97.5 F

## 2021-04-05 PROCEDURE — 99213 OFFICE O/P EST LOW 20 MIN: CPT

## 2021-04-05 PROCEDURE — 99072 ADDL SUPL MATRL&STAF TM PHE: CPT

## 2021-04-07 NOTE — DISCUSSION/SUMMARY
[FreeTextEntry1] : Update: Doing well, no seizures for over one year (lst vent triggered by sleep deprivation\par mood good\par \par 52 year old RH dominant woman here for evaluation of two possible seizure events, generalized with LOC on both events and post-ictal confusion and lethargy- focal seizures w sec generalization, focal seizure w dyscognitive features or primary gen seizures? EEG + R temp sharp wave dcs \par \par \par Plan\par - increase Vimpat back up to at least 100mg BID- counselled on the i,portance of compliance i.e.\par - reviewed seizure triggers i.e sleep deprivation  \par -reminded to practice Exercise RX meditation \par -annual labwork\par -patient given info for patient assistance program [employer] for Vimpat\par - follow up in 6 months\par
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
## 2021-04-07 NOTE — REVIEW OF SYSTEMS
[Confused or Disoriented] : no confusion [Memory Lapses or Loss] : no memory loss [Decr. Concentrating Ability] : no decrease in concentrating ability [Difficulty with Language] : no ~M difficulty with language [Changed Thought Patterns] : no change in thought patterns [Repeating Questions] : no repeated questioning about recent events [Facial Weakness] : no facial weakness [Arm Weakness] : no arm weakness [Hand Weakness] : no hand weakness [Leg Weakness] : no leg weakness [Poor Coordination] : good coordination [Difficulty Writing] : no difficulty writing [Difficulties in Speech] : no speech difficulties [Numbness] : no numbness [Tingling] : tingling [Abnormal Sensation] : no abnormal sensation [Hypersensitivity] : no hypersensitivity [Seizures] : convulsions [Dizziness] : dizziness [Fainting] : no fainting [Lightheadedness] : lightheadedness [Vertigo] : no vertigo [Cluster Headache] : no cluster headache [Migraine Headache] : no migraine headache [Tension Headache] : tension-type headaches [Difficulty Walking] : no difficulty walking [Inability to Walk] : able to walk [Ataxia] : no ataxia [Frequent Falls] : not falling [Limping] : not limping [Suicidal] : not suicidal [Anxiety] : no anxiety [Depression] : no depression [Change In Personality] : no personality change [Emotional Problems] : no emotional problems [As Noted in HPI] : as noted in HPI [Negative] : Endocrine

## 2021-04-07 NOTE — PHYSICAL EXAM
[General Appearance - Alert] : alert [General Appearance - In No Acute Distress] : in no acute distress [Oriented To Time, Place, And Person] : oriented to person, place, and time [Impaired Insight] : insight and judgment were intact [Affect] : the affect was normal [Sclera] : the sclera and conjunctiva were normal [Extraocular Movements] : extraocular movements were intact [PERRL With Normal Accommodation] : pupils were equal in size, round, reactive to light, with normal accommodation [Full Pulse] : the pedal pulses are present [Involuntary Movements] : no involuntary movements were seen [FreeTextEntry1] : Cast on Left leg

## 2021-04-07 NOTE — DISCUSSION/SUMMARY
[FreeTextEntry1] : Update: Doing well, no seizures for over one year (lst vent triggered by sleep deprivation\par mood good\par \par 52 year old RH dominant woman here for evaluation of two possible seizure events, generalized with LOC on both events and post-ictal confusion and lethargy- focal seizures w sec generalization, focal seizure w dyscognitive features or primary gen seizures? EEG + R temp sharp wave dcs \par \par \par Plan\par - increase Vimpat back up to at least 100mg BID- counselled on the i,portance of compliance i.e.\par - reviewed seizure triggers i.e sleep deprivation  \par -reminded to practice Exercise RX meditation \par -annual labwork\par -patient given info for patient assistance program [employer] for Vimpat\par - follow up in 6 months\par
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
## 2021-04-07 NOTE — DATA REVIEWED
[de-identified] : Personally reviewed MRI from October 2015.  No abnormal findings on my review - does not have report [de-identified] : REEG from Sept 2015 normal.  Had AEEG done Oct 2015, report not yet available.

## 2021-04-07 NOTE — DATA REVIEWED
[de-identified] : Personally reviewed MRI from October 2015.  No abnormal findings on my review - does not have report [de-identified] : REEG from Sept 2015 normal.  Had AEEG done Oct 2015, report not yet available.

## 2022-03-04 ENCOUNTER — APPOINTMENT (OUTPATIENT)
Dept: NEUROLOGY | Facility: CLINIC | Age: 57
End: 2022-03-04
Payer: SELF-PAY

## 2022-03-04 ENCOUNTER — NON-APPOINTMENT (OUTPATIENT)
Age: 57
End: 2022-03-04

## 2022-03-04 PROCEDURE — 99213 OFFICE O/P EST LOW 20 MIN: CPT | Mod: 95

## 2022-03-06 NOTE — DISCUSSION/SUMMARY
[FreeTextEntry1] : Update: Doing well, no seizures for over one year (lst vent triggered by sleep deprivation\par mood good\par \par 52 year old RH dominant woman here for evaluation of two possible seizure events, generalized with LOC on both events and post-ictal confusion and lethargy- focal seizures w sec generalization, focal seizure w dyscognitive features or primary gen seizures? EEG + R temp sharp wave dcs \par \par \par Plan\par -  Vimpat back up to at l100mg BID-\par - reviewed seizure triggers i.e sleep deprivation  compliance with AED\par -reminded to practice Exercise RX meditation \par -annual labwork\par - follow up in 6 months\par
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
## 2022-03-06 NOTE — DATA REVIEWED
[de-identified] : Personally reviewed MRI from October 2015.  No abnormal findings on my review - does not have report [de-identified] : REEG from Sept 2015 normal.  Had AEEG done Oct 2015, report not yet available.

## 2022-03-06 NOTE — REVIEW OF SYSTEMS
[Tingling] : tingling [Seizures] : convulsions [Dizziness] : dizziness [Lightheadedness] : lightheadedness [Tension Headache] : tension-type headaches [As Noted in HPI] : as noted in HPI [Negative] : Endocrine [Confused or Disoriented] : no confusion [Memory Lapses or Loss] : no memory loss [Decr. Concentrating Ability] : no decrease in concentrating ability [Difficulty with Language] : no ~M difficulty with language [Changed Thought Patterns] : no change in thought patterns [Repeating Questions] : no repeated questioning about recent events [Facial Weakness] : no facial weakness [Arm Weakness] : no arm weakness [Hand Weakness] : no hand weakness [Leg Weakness] : no leg weakness [Poor Coordination] : good coordination [Difficulty Writing] : no difficulty writing [Difficulties in Speech] : no speech difficulties [Numbness] : no numbness [Abnormal Sensation] : no abnormal sensation [Hypersensitivity] : no hypersensitivity [Fainting] : no fainting [Cluster Headache] : no cluster headache [Vertigo] : no vertigo [Migraine Headache] : no migraine headache [Difficulty Walking] : no difficulty walking [Inability to Walk] : able to walk [Ataxia] : no ataxia [Frequent Falls] : not falling [Limping] : not limping [Anxiety] : no anxiety [Suicidal] : not suicidal [Depression] : no depression [Change In Personality] : no personality change [Emotional Problems] : no emotional problems

## 2022-05-31 NOTE — PATIENT PROFILE ADULT. - VISION (WITH CORRECTIVE LENSES IF THE PATIENT USUALLY WEARS THEM):
Patient:  Tanesha Davis  Clinic #:  [id number]   Date of Note: 2/6/2018  Referring Physician: Caleb Hernandez MD    Diagnosis:    Encounter Diagnosis   Name Primary?    Bilateral hand pain Yes       Start Time: 9:10 am  End Time: 9:45 am  Total Time: 35 min  Group Time: -    2 of 20 visits expires 12/31/18    Subjective:   Pt reports her hands are still very painful  Pain: 7 out of 10     Objective:   Patient seen by OT this session. Treatment  consist of the following:  Paraffin and Therapeutic exercises    Treatment: Paraffin x 8 min and Therapeutic exercises x 27 min  Patient received paraffin bath to bilateral hand(s) for 8 minutes to increase blood flow, circulation, pain management and for tissue elasticity prior to therex. Pt was engaged in isolated hook, flat and regular fists x 10 reps. Towel grabs with each hand x 5 reps.  Thumb radial abduction x 10 reps, finger abd/adduction x 10 reps, thumb opposition x 10 reps, thumb circumduction x 10 reps. Ergo gripper with red band resistance x 4 min. Education re: joint protection and information re: thumb supports and adaptive devices to assist in decreasing stress on her hands.    Plan of care rec'd from Caleb Hernandez MD for care from 1/30/18 to 2/27/18      Assessment:  Pt with slight decrease in hand discomfort after therapy. Pt appeared to understand information provided re: joint protection and adaptations.  Pt will continue to benefit from skilled OT intervention. Medical necessity is demonstrated by: Pt continues to be limited in functional and leisurely pursuits. Pain limits patients participation in ADL's. Pt is not able to carryout necessary vocational tasks. Patient continues to requires cues and skilled supervision to complete HEP      Plan:  Continue with plan of care 1 x week x prn weeks  during the certification period from  1/30/18 to 2/27/18   in pursuit of  OT goals.         [General Appearance - Well Developed] : well developed [Normal Appearance] : normal appearance [Well Groomed] : well groomed [General Appearance - Well Nourished] : well nourished [No Deformities] : no deformities [General Appearance - In No Acute Distress] : no acute distress [Normal Conjunctiva] : the conjunctiva exhibited no abnormalities [Eyelids - No Xanthelasma] : the eyelids demonstrated no xanthelasmas [Normal Oral Mucosa] : normal oral mucosa [No Oral Pallor] : no oral pallor [No Oral Cyanosis] : no oral cyanosis [Normal Jugular Venous A Waves Present] : normal jugular venous A waves present [Normal Jugular Venous V Waves Present] : normal jugular venous V waves present [No Jugular Venous Wilson A Waves] : no jugular venous wilson A waves [Respiration, Rhythm And Depth] : normal respiratory rhythm and effort [Exaggerated Use Of Accessory Muscles For Inspiration] : no accessory muscle use [Auscultation Breath Sounds / Voice Sounds] : lungs were clear to auscultation bilaterally [Heart Rate And Rhythm] : heart rate and rhythm were normal [Heart Sounds] : normal S1 and S2 [Murmurs] : no murmurs present [Abdomen Soft] : soft [Abdomen Tenderness] : non-tender Normal vision: sees adequately in most situations; can see medication labels, newsprint [Abdomen Mass (___ Cm)] : no abdominal mass palpated [Abnormal Walk] : normal gait [Gait - Sufficient For Exercise Testing] : the gait was sufficient for exercise testing [Nail Clubbing] : no clubbing of the fingernails [Cyanosis, Localized] : no localized cyanosis [Petechial Hemorrhages (___cm)] : no petechial hemorrhages [Skin Color & Pigmentation] : normal skin color and pigmentation [] : no rash [No Venous Stasis] : no venous stasis [Skin Lesions] : no skin lesions [No Skin Ulcers] : no skin ulcer [No Xanthoma] : no  xanthoma was observed [Oriented To Time, Place, And Person] : oriented to person, place, and time [Affect] : the affect was normal [Mood] : the mood was normal [No Anxiety] : not feeling anxious

## 2022-09-01 ENCOUNTER — NON-APPOINTMENT (OUTPATIENT)
Age: 57
End: 2022-09-01

## 2022-09-07 ENCOUNTER — NON-APPOINTMENT (OUTPATIENT)
Age: 57
End: 2022-09-07

## 2022-09-13 DIAGNOSIS — N95.9 UNSPECIFIED MENOPAUSAL AND PERIMENOPAUSAL DISORDER: ICD-10-CM

## 2022-09-13 RX ORDER — ESTRADIOL 0.07 MG/D
0.07 PATCH TRANSDERMAL WEEKLY
Qty: 4 | Refills: 11 | Status: ACTIVE | COMMUNITY
Start: 2022-09-13 | End: 1900-01-01

## 2022-10-16 NOTE — ED PROVIDER NOTE - NS ED MD TWO NIGHTS YN
Cardiovascular Surgery Progress Note    Name: Clary Bah  MRN: [id number]  : 1966  Admit Date: 10/6/2022  9:17 PM  4 Days Post-Op     Procedure:  Procedure(s) and Anesthesia Type:     * CORONARY ARTERY BYPASS GRAFTING X2, ENDOSCOPIC VEIN HARVEST - General     * ECHOCARDIOGRAM, TRANSESOPHAGEAL - General    Vitals:  Vitals:    10/16/22 0400 10/16/22 0448 10/16/22 0500 10/16/22 0725   BP: (!) 141/70   139/65   Pulse: 91   86   Resp:    18   Temp:    37 °C (98.6 °F)   TempSrc:    Temporal   SpO2: 92%  93% 93%   Weight:  96.3 kg (212 lb 4.9 oz)     Height:          Temp (24hrs), Av.1 °C (98.8 °F), Min:37 °C (98.6 °F), Max:37.2 °C (99 °F)      Respiratory:    Respiration: 18, Pulse Oximetry: 93 %       Fluids:    Intake/Output Summary (Last 24 hours) at 10/16/2022 0813  Last data filed at 10/16/2022 0552  Gross per 24 hour   Intake --   Output 1600 ml   Net -1600 ml       Admit weight: Weight: 113 kg (249 lb)  Current weight: Weight: 96.3 kg (212 lb 4.9 oz) (10/16/22 0448)    Labs:  Recent Labs     10/14/22  0324 10/15/22  0035 10/16/22  0208   WBC 9.9 8.6 7.9   RBC 3.34* 3.05* 3.17*   HEMOGLOBIN 10.3* 9.4* 9.7*   HEMATOCRIT 30.2* 27.2* 28.4*   MCV 90.4 89.2 89.6   MCH 30.8 30.8 30.6   MCHC 34.1 34.6 34.2   RDW 43.5 43.0 44.0   PLATELETCT 209 203 258   MPV 10.3 10.3 10.2       Recent Labs     10/14/22  0324 10/15/22  0035 10/16/22  0208   SODIUM 135 138 139   POTASSIUM 4.0 3.8 3.4*   CHLORIDE 103 103 104   CO2 24 26 26   GLUCOSE 117* 108* 109*   BUN 11 12 13   CREATININE 0.51 0.55 0.56   CALCIUM 8.9 8.8 8.8                 Medications:  Scheduled Medications   Medication Dose Frequency    gabapentin  100 mg TID    amiodarone  400 mg TWICE DAILY    furosemide  20 mg Q DAY    Pharmacy Consult Request  1 Each PHARMACY TO DOSE    acetaminophen  1,000 mg Q6HRS    senna-docusate  2 Tablet BID    And    polyethylene glycol/lytes  1 Packet DAILY    And    magnesium hydroxide  30 mL DAILY    omeprazole  20 mg DAILY     clopidogrel  75 mg DAILY    rosuvastatin  20 mg QHS    carvedilol  3.125 mg BID WITH MEALS    losartan  100 mg Q DAY    aspirin EC  81 mg DAILY    levothyroxine  100 mcg AM ES          Exam:   Physical Exam  Constitutional:       General: She is not in acute distress.  HENT:      Head: Normocephalic and atraumatic.      Mouth/Throat:      Mouth: Mucous membranes are moist.      Pharynx: Oropharynx is clear.   Eyes:      Pupils: Pupils are equal, round, and reactive to light.   Cardiovascular:      Rate and Rhythm: Normal rate and regular rhythm.      Pulses: Normal pulses.   Pulmonary:      Effort: Pulmonary effort is normal.      Breath sounds: Decreased breath sounds present.   Abdominal:      General: Bowel sounds are normal.      Palpations: Abdomen is soft.      Tenderness: There is no abdominal tenderness.   Musculoskeletal:      Cervical back: Neck supple. No tenderness.   Skin:     General: Skin is warm and dry.      Comments: Sternal incision, prevena in place  SVG site- cdi   Neurological:      General: No focal deficit present.      Mental Status: She is oriented to person, place, and time. Mental status is at baseline.   Psychiatric:         Mood and Affect: Mood normal.         Behavior: Behavior normal.       Cardiac Medications:    ASA - Yes    Plavix - Yes    Post-operative Beta Blockers - Yes    Ace/ARB- Yes    Statin - Yes    Aldactone- No; contraindicated because of Normal EF    Ejection Fraction:  40%    Telemetry:   10/13 SR  10/14 SR, Afib RVR  10/15 SR  10/16 SR one episode of non-sustained afib    Assessment/Plan:  POD 1  HDS, SR, neuro intact, wounds CDI, abdomen soft, fluid balance up, wt up,  3L NC, 290 mL out of chest tubes overnight Plan:  Keep chest tubes and pacing wires, diurese, IS/ambulate.    POD 2  HDS, SR, neuro intact, wounds CDI, abdomen soft, fluid balance negative, wt down, diuresing well, 100 out of chest tubes overnight.  Plan:  Remove chest tubes, keep pacing wires,  IS/ambulate.     POD 3 HDS, SR- Afib w/ RVR yesterday- started on amio- change to PO- d/c pacer wires, cont diuresis- d/c bill, amb/enc IS    POD 4 HDS, SR- one episode of non-sustained afib- on PO amio, diuresed well- cont, right sided burning rib pain- start Neurontin, Room air, ambulating, plan home in am if rhythm stable      Disposition:  10/15 PT/OT cleared for home     Yes

## 2023-01-11 ENCOUNTER — RESULT CHARGE (OUTPATIENT)
Age: 58
End: 2023-01-11

## 2023-01-11 ENCOUNTER — APPOINTMENT (OUTPATIENT)
Dept: OBGYN | Facility: CLINIC | Age: 58
End: 2023-01-11
Payer: MEDICAID

## 2023-01-11 VITALS
DIASTOLIC BLOOD PRESSURE: 65 MMHG | WEIGHT: 115 LBS | SYSTOLIC BLOOD PRESSURE: 114 MMHG | BODY MASS INDEX: 18.01 KG/M2 | HEART RATE: 90 BPM

## 2023-01-11 DIAGNOSIS — Z00.00 ENCOUNTER FOR GENERAL ADULT MEDICAL EXAMINATION W/OUT ABNORMAL FINDINGS: ICD-10-CM

## 2023-01-11 DIAGNOSIS — R92.2 INCONCLUSIVE MAMMOGRAM: ICD-10-CM

## 2023-01-11 LAB
BILIRUB UR QL STRIP: NORMAL
CLARITY UR: CLEAR
COLLECTION METHOD: NORMAL
DATE COLLECTED: NORMAL
GLUCOSE UR-MCNC: NORMAL
HCG UR QL: 0.2 EU/DL
HEMOCCULT SP1 STL QL: POSITIVE
HEMOGLOBIN: 15.2
HGB UR QL STRIP.AUTO: NORMAL
KETONES UR-MCNC: NORMAL
LEUKOCYTE ESTERASE UR QL STRIP: NORMAL
NITRITE UR QL STRIP: NORMAL
PH UR STRIP: 6
PROT UR STRIP-MCNC: NORMAL
QUALITY CONTROL: YES
SP GR UR STRIP: 1.02

## 2023-01-11 PROCEDURE — 99396 PREV VISIT EST AGE 40-64: CPT

## 2023-01-11 PROCEDURE — 85018 HEMOGLOBIN: CPT | Mod: QW

## 2023-01-11 PROCEDURE — 81003 URINALYSIS AUTO W/O SCOPE: CPT | Mod: QW

## 2023-01-11 PROCEDURE — 82270 OCCULT BLOOD FECES: CPT

## 2023-01-11 NOTE — HISTORY OF PRESENT ILLNESS
[TextBox_4] : Patient is a 58 y/o female here today for annual visit. S/p total hysterectomy and BSO. \par She has hx of osteoporosis, Severe intolerance to bisphosphates, tried prolia a few times now her t scores are being managed with estrogen therapy on estrogen patch twice weekly. due to insurance reasons we will switch her to estradiol 1mg tablet. \par \par Patient recently had full body work up, pending results.

## 2023-01-11 NOTE — PHYSICAL EXAM
[Chaperone Present] : A chaperone was present in the examining room during all aspects of the physical examination [Appropriately responsive] : appropriately responsive [Alert] : alert [No Lymphadenopathy] : no lymphadenopathy [Soft] : soft [Non-tender] : non-tender [Oriented x3] : oriented x3 [Examination Of The Breasts] : a normal appearance [No Discharge] : no discharge [No Masses] : no breast masses were palpable [Labia Majora] : normal [Labia Minora] : normal [Normal] : normal [Atrophy] : atrophy [Absent] : absent [Uterine Adnexae] : absent [Normal rectal exam] : was normal [Occult Blood Positive] : was positive for occult blood analysis [FreeTextEntry1] : Carla JOSEPH-HAY chaperoned during entire physical exam

## 2023-01-11 NOTE — PLAN
[FreeTextEntry1] : \par \par Patient to follow up in 1 year for annual GYN exam\par Mammogram and bilateral breast US due:  now Rx given \par Colonoscopy due:  8/23 Dr. gutierres \par Bone density due: just had bone density done, will forward results to me. \par Pap ordered\par Hemoccult ordered \par All questions answered, patient agreeable with plan.\par \par \par \par I Carla JOSEPH-C am scribing for the presence of Dr. Kumar the following sections HISTORY OF PRESENT ILLNESS, PAST MEDICAL/FAMILY/SOCIAL HISTORY; REVIEW OF SYSTEMS; VITAL SIGNS; PHYSICAL EXAM; DISPOSITION. \par \par \par I personally performed the services described in the documentation, reviewed the documentation recorded by the scribe in my presence and it accurately and completely records my words and actions.\par \par

## 2023-01-17 LAB — CYTOLOGY CVX/VAG DOC THIN PREP: NORMAL

## 2023-05-18 RX ORDER — ESTRADIOL 0.07 MG/D
0.07 PATCH TRANSDERMAL WEEKLY
Qty: 2 | Refills: 11 | Status: ACTIVE | COMMUNITY
Start: 2022-09-13

## 2023-09-14 ENCOUNTER — NON-APPOINTMENT (OUTPATIENT)
Age: 58
End: 2023-09-14

## 2023-09-15 ENCOUNTER — RX RENEWAL (OUTPATIENT)
Age: 58
End: 2023-09-15

## 2023-09-22 ENCOUNTER — NON-APPOINTMENT (OUTPATIENT)
Age: 58
End: 2023-09-22

## 2023-09-28 NOTE — DISCHARGE NOTE ADULT - MEDICATION SUMMARY - MEDICATIONS TO STOP TAKING
I will STOP taking the medications listed below when I get home from the hospital:  None DM2 (diabetes mellitus, type 2)

## 2023-11-02 NOTE — ED ADULT TRIAGE NOTE - CHIEF COMPLAINT QUOTE
----- Message from CARLOTA Grady sent at 10/31/2023 12:11 PM CDT -----  Please notify Jazlyn via  that FibroScan shows no evidence of liver stiffening/scarring but does show severe steatosis/fat.    Recommend she follow a heart healthy, Mediterranean diet along with a slow weight loss of 1-2 lb on a weekly basis.  BMI is 37.91.      Recommend to avoid alcohol consumption.      Recommend hepatitis A and B vaccine if she has not already received.    I had ordered an ultrasound of the liver/gallbladder and pancreas as well as full liver work up however I do not believe this has been completed. Orders for both have been placed. Please remind patient to complete at her earliest convenience.  I would recommend office visit in 1 month to discuss further.    Thank you.   Abdominal pain that started this morning. + vomiting. Denies fevers. States she has had this pain before when her gallbladder ruptured.

## 2023-12-06 ENCOUNTER — APPOINTMENT (OUTPATIENT)
Dept: UROLOGY | Facility: CLINIC | Age: 58
End: 2023-12-06
Payer: COMMERCIAL

## 2023-12-06 VITALS
RESPIRATION RATE: 15 BRPM | SYSTOLIC BLOOD PRESSURE: 105 MMHG | OXYGEN SATURATION: 97 % | BODY MASS INDEX: 18.05 KG/M2 | HEART RATE: 61 BPM | DIASTOLIC BLOOD PRESSURE: 65 MMHG | HEIGHT: 67 IN | WEIGHT: 115 LBS

## 2023-12-06 DIAGNOSIS — E27.8 OTHER SPECIFIED DISORDERS OF ADRENAL GLAND: ICD-10-CM

## 2023-12-06 PROCEDURE — 99203 OFFICE O/P NEW LOW 30 MIN: CPT

## 2024-02-21 RX ORDER — LACOSAMIDE 150 MG/1
150 TABLET ORAL
Qty: 60 | Refills: 3 | Status: ACTIVE | COMMUNITY
Start: 2020-03-12 | End: 1900-01-01

## 2024-02-22 ENCOUNTER — RX RENEWAL (OUTPATIENT)
Age: 59
End: 2024-02-22

## 2024-02-22 RX ORDER — ESTRADIOL 1 MG/1
1 TABLET ORAL
Qty: 90 | Refills: 3 | Status: ACTIVE | COMMUNITY
Start: 2023-01-11

## 2024-04-15 ENCOUNTER — APPOINTMENT (OUTPATIENT)
Dept: NEUROLOGY | Facility: CLINIC | Age: 59
End: 2024-04-15

## 2024-09-12 DIAGNOSIS — Z12.39 ENCOUNTER FOR OTHER SCREENING FOR MALIGNANT NEOPLASM OF BREAST: ICD-10-CM

## 2024-09-15 ENCOUNTER — NON-APPOINTMENT (OUTPATIENT)
Age: 59
End: 2024-09-15

## 2024-09-25 ENCOUNTER — APPOINTMENT (OUTPATIENT)
Dept: MAMMOGRAPHY | Facility: CLINIC | Age: 59
End: 2024-09-25

## 2024-09-25 ENCOUNTER — APPOINTMENT (OUTPATIENT)
Dept: ULTRASOUND IMAGING | Facility: CLINIC | Age: 59
End: 2024-09-25

## 2024-09-29 ENCOUNTER — NON-APPOINTMENT (OUTPATIENT)
Age: 59
End: 2024-09-29

## 2024-09-30 ENCOUNTER — APPOINTMENT (OUTPATIENT)
Dept: NEUROLOGY | Facility: CLINIC | Age: 59
End: 2024-09-30
Payer: COMMERCIAL

## 2024-09-30 VITALS
HEART RATE: 59 BPM | DIASTOLIC BLOOD PRESSURE: 74 MMHG | SYSTOLIC BLOOD PRESSURE: 129 MMHG | HEIGHT: 67 IN | WEIGHT: 115 LBS | BODY MASS INDEX: 18.05 KG/M2

## 2024-09-30 DIAGNOSIS — Z00.00 ENCOUNTER FOR GENERAL ADULT MEDICAL EXAMINATION W/OUT ABNORMAL FINDINGS: ICD-10-CM

## 2024-09-30 DIAGNOSIS — R56.9 UNSPECIFIED CONVULSIONS: ICD-10-CM

## 2024-09-30 PROCEDURE — G2211 COMPLEX E/M VISIT ADD ON: CPT

## 2024-09-30 PROCEDURE — 99214 OFFICE O/P EST MOD 30 MIN: CPT
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
## 2024-09-30 NOTE — DISCUSSION/SUMMARY
[FreeTextEntry1] : NADER WHIPPLEJOHNNY, 58 yo RHF h/o with history of trigeminal neuralgia (surgery w/ Allen Arana July 2015), hypothyroidism (on Synthroid)- focal seizures w sec generalization, focal seizure w dyscognitive features or primary gen seizures?  Last sz in 2016. No interval sz on Vimpat 150 mg bid, well tolerated.  No new complains.  pt not interested in decreasing ASM.  I ordered 24 aEEG  to have baseline    Plan -  Continue Vimpat 150 mg bid well tolerated  - reviewed seizure triggers - ordered 24 hr aEEG next avail ok -  all questions answered  - RTC April 2025 with Dr Dillon knows to call sooner if needed  x 35 min [Well-controlled] : well-controlled [Risks Associated with Driving/[place] Law] : As per my usual protocol, the patient was advised in regards to risks and driving privileges associated with the [place] Guidelines.  [Safety Recommendations] : The patient was advised in regards to the risk of seizures and general seizure safety recommendations including not to be bathing alone, climbing to high places and operating heavy machinery. [Compliance with Medications] : The importance of compliance with medications was reinforced. [Medication Side Effects] : High frequency and serious potential medication adverse effects were reviewed with the patient, including but not exclusive to psychiatric effects.  Information sheets on medication side effects were made available to the patient in our clinic.  The patient or advocate agrees to notify us for any concerns. [Sleep Hygiene/Sleep Disruption Risks] : Sleep hygiene and the risks of sleep disruption were discussed.

## 2024-09-30 NOTE — DISCUSSION/SUMMARY
[FreeTextEntry1] : NADER WHIPPLEJOHNNY, 58 yo RHF h/o with history of trigeminal neuralgia (surgery w/ Allen Arana July 2015), hypothyroidism (on Synthroid)- focal seizures w sec generalization, focal seizure w dyscognitive features or primary gen seizures?  Last sz in 2016. No interval sz on Vimpat 150 mg bid, well tolerated.  No new complains.  pt not interested in decreasing ASM.  I ordered 24 aEEG  to have baseline    Plan -  Continue Vimpat 150 mg bid well tolerated  - reviewed seizure triggers - ordered 24 hr aEEG next avail ok -  all questions answered  - RTC April 2025 with Dr Dillon knows to call sooner if needed  x 35 min [Well-controlled] : well-controlled [Risks Associated with Driving/[place] Law] : As per my usual protocol, the patient was advised in regards to risks and driving privileges associated with the [place] Guidelines.  [Safety Recommendations] : The patient was advised in regards to the risk of seizures and general seizure safety recommendations including not to be bathing alone, climbing to high places and operating heavy machinery. [Compliance with Medications] : The importance of compliance with medications was reinforced. [Medication Side Effects] : High frequency and serious potential medication adverse effects were reviewed with the patient, including but not exclusive to psychiatric effects.  Information sheets on medication side effects were made available to the patient in our clinic.  The patient or advocate agrees to notify us for any concerns. [Sleep Hygiene/Sleep Disruption Risks] : Sleep hygiene and the risks of sleep disruption were discussed. no
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
## 2024-09-30 NOTE — DATA REVIEWED
[de-identified] : Personally reviewed MRI from October 2015.  No abnormal findings on my review - does not have report [de-identified] : rEEG from Sept 2015 normal.

## 2024-09-30 NOTE — DATA REVIEWED
[de-identified] : Personally reviewed MRI from October 2015.  No abnormal findings on my review - does not have report [de-identified] : rEEG from Sept 2015 normal.

## 2024-09-30 NOTE — REVIEW OF SYSTEMS
[Seizures] : convulsions [Dizziness] : dizziness [Tension Headache] : tension-type headaches [As Noted in HPI] : as noted in HPI [Negative] : Psychiatric